# Patient Record
Sex: FEMALE | Race: WHITE | NOT HISPANIC OR LATINO | Employment: OTHER | URBAN - METROPOLITAN AREA
[De-identification: names, ages, dates, MRNs, and addresses within clinical notes are randomized per-mention and may not be internally consistent; named-entity substitution may affect disease eponyms.]

---

## 2018-06-25 ENCOUNTER — OFFICE VISIT CONVERTED (OUTPATIENT)
Dept: FAMILY MEDICINE CLINIC | Facility: CLINIC | Age: 63
End: 2018-06-25
Attending: NURSE PRACTITIONER

## 2018-06-25 ENCOUNTER — CONVERSION ENCOUNTER (OUTPATIENT)
Dept: FAMILY MEDICINE CLINIC | Facility: CLINIC | Age: 63
End: 2018-06-25

## 2018-08-16 ENCOUNTER — OFFICE VISIT CONVERTED (OUTPATIENT)
Dept: FAMILY MEDICINE CLINIC | Facility: CLINIC | Age: 63
End: 2018-08-16
Attending: NURSE PRACTITIONER

## 2019-03-03 ENCOUNTER — HOSPITAL ENCOUNTER (OUTPATIENT)
Dept: OTHER | Facility: HOSPITAL | Age: 64
Discharge: HOME | End: 2019-03-03

## 2019-03-04 ENCOUNTER — HOSPITAL ENCOUNTER (OUTPATIENT)
Dept: OTHER | Facility: HOSPITAL | Age: 64
Discharge: HOME | End: 2019-03-04

## 2019-03-04 LAB
BASOPHILS # BLD AUTO: 0.06 10*3/UL (ref 0–0.2)
BASOPHILS NFR BLD AUTO: 0.7 % (ref 0–3)
CONV ABS IMM GRAN: 0.19 10*3/UL (ref 0–0.2)
CONV IMMATURE GRAN: 2.1 % (ref 0–1.8)
DEPRECATED RDW RBC AUTO: 47.3 FL (ref 36.4–46.3)
EOSINOPHIL # BLD AUTO: 0.29 10*3/UL (ref 0–0.7)
EOSINOPHIL # BLD AUTO: 3.2 % (ref 0–7)
ERYTHROCYTE [DISTWIDTH] IN BLOOD BY AUTOMATED COUNT: 13 % (ref 11.7–14.4)
HBA1C MFR BLD: 10 G/DL (ref 12–16)
HCT VFR BLD AUTO: 31.4 % (ref 37–47)
LYMPHOCYTES # BLD AUTO: 2.2 10*3/UL (ref 1–5)
MCH RBC QN AUTO: 31.6 PG (ref 27–31)
MCHC RBC AUTO-ENTMCNC: 31.8 G/DL (ref 33–37)
MCV RBC AUTO: 99.4 FL (ref 81–99)
MONOCYTES # BLD AUTO: 0.93 10*3/UL (ref 0.2–1.2)
MONOCYTES NFR BLD AUTO: 10.3 % (ref 3–10)
NEUTROPHILS # BLD AUTO: 5.35 10*3/UL (ref 2–8)
NEUTROPHILS NFR BLD AUTO: 59.3 % (ref 30–85)
NRBC CBCN: 0 % (ref 0–0.7)
PLATELET # BLD AUTO: 437 10*3/UL (ref 130–400)
PMV BLD AUTO: 10.1 FL (ref 9.4–12.3)
RBC # BLD AUTO: 3.16 10*6/UL (ref 4.2–5.4)
VARIANT LYMPHS NFR BLD MANUAL: 24.4 % (ref 20–45)
WBC # BLD AUTO: 9.02 10*3/UL (ref 4.8–10.8)

## 2019-03-11 ENCOUNTER — OFFICE VISIT CONVERTED (OUTPATIENT)
Dept: ORTHOPEDIC SURGERY | Facility: CLINIC | Age: 64
End: 2019-03-11
Attending: ORTHOPAEDIC SURGERY

## 2019-03-18 ENCOUNTER — CONVERSION ENCOUNTER (OUTPATIENT)
Dept: FAMILY MEDICINE CLINIC | Facility: CLINIC | Age: 64
End: 2019-03-18

## 2019-03-18 ENCOUNTER — OFFICE VISIT CONVERTED (OUTPATIENT)
Dept: FAMILY MEDICINE CLINIC | Facility: CLINIC | Age: 64
End: 2019-03-18
Attending: NURSE PRACTITIONER

## 2019-04-01 ENCOUNTER — OFFICE VISIT CONVERTED (OUTPATIENT)
Dept: SURGERY | Facility: CLINIC | Age: 64
End: 2019-04-01
Attending: NURSE PRACTITIONER

## 2019-04-12 ENCOUNTER — OFFICE VISIT CONVERTED (OUTPATIENT)
Dept: ORTHOPEDIC SURGERY | Facility: CLINIC | Age: 64
End: 2019-04-12
Attending: ORTHOPAEDIC SURGERY

## 2019-04-12 ENCOUNTER — CONVERSION ENCOUNTER (OUTPATIENT)
Dept: ORTHOPEDIC SURGERY | Facility: CLINIC | Age: 64
End: 2019-04-12

## 2019-04-19 ENCOUNTER — CONVERSION ENCOUNTER (OUTPATIENT)
Dept: FAMILY MEDICINE CLINIC | Facility: CLINIC | Age: 64
End: 2019-04-19

## 2019-04-19 ENCOUNTER — OFFICE VISIT CONVERTED (OUTPATIENT)
Dept: FAMILY MEDICINE CLINIC | Facility: CLINIC | Age: 64
End: 2019-04-19
Attending: NURSE PRACTITIONER

## 2019-04-19 ENCOUNTER — HOSPITAL ENCOUNTER (OUTPATIENT)
Dept: LAB | Facility: HOSPITAL | Age: 64
Discharge: HOME | End: 2019-04-19
Attending: NURSE PRACTITIONER

## 2019-04-19 LAB
25(OH)D3 SERPL-MCNC: 25.1 NG/ML (ref 30–100)
BASOPHILS # BLD AUTO: 0.04 10*3/UL (ref 0–0.2)
BASOPHILS NFR BLD AUTO: 0.4 % (ref 0–3)
CONV ABS IMM GRAN: 0.06 10*3/UL (ref 0–0.2)
CONV IMMATURE GRAN: 0.6 % (ref 0–1.8)
DEPRECATED RDW RBC AUTO: 47.3 FL (ref 36.4–46.3)
EOSINOPHIL # BLD AUTO: 0.13 10*3/UL (ref 0–0.7)
EOSINOPHIL # BLD AUTO: 1.3 % (ref 0–7)
ERYTHROCYTE [DISTWIDTH] IN BLOOD BY AUTOMATED COUNT: 13.3 % (ref 11.7–14.4)
FERRITIN SERPL-MCNC: 80 NG/ML (ref 10–200)
FOLATE SERPL-MCNC: >20 NG/ML (ref 4.8–20)
HBA1C MFR BLD: 13.6 G/DL (ref 12–16)
HCT VFR BLD AUTO: 42 % (ref 37–47)
IRON SATN MFR SERPL: 17 % (ref 20–55)
IRON SERPL-MCNC: 50 UG/DL (ref 60–170)
LYMPHOCYTES # BLD AUTO: 2.44 10*3/UL (ref 1–5)
MCH RBC QN AUTO: 31 PG (ref 27–31)
MCHC RBC AUTO-ENTMCNC: 32.4 G/DL (ref 33–37)
MCV RBC AUTO: 95.7 FL (ref 81–99)
MONOCYTES # BLD AUTO: 0.78 10*3/UL (ref 0.2–1.2)
MONOCYTES NFR BLD AUTO: 7.6 % (ref 3–10)
NEUTROPHILS # BLD AUTO: 6.83 10*3/UL (ref 2–8)
NEUTROPHILS NFR BLD AUTO: 66.4 % (ref 30–85)
NRBC CBCN: 0 % (ref 0–0.7)
PLATELET # BLD AUTO: 314 10*3/UL (ref 130–400)
PMV BLD AUTO: 10.6 FL (ref 9.4–12.3)
RBC # BLD AUTO: 4.39 10*6/UL (ref 4.2–5.4)
TIBC SERPL-MCNC: 302 UG/DL (ref 245–450)
TRANSFERRIN SERPL-MCNC: 211 MG/DL (ref 250–380)
VARIANT LYMPHS NFR BLD MANUAL: 23.7 % (ref 20–45)
VIT B12 SERPL-MCNC: 485 PG/ML (ref 211–911)
WBC # BLD AUTO: 10.28 10*3/UL (ref 4.8–10.8)

## 2019-04-22 ENCOUNTER — HOSPITAL ENCOUNTER (OUTPATIENT)
Dept: GASTROENTEROLOGY | Facility: HOSPITAL | Age: 64
Setting detail: HOSPITAL OUTPATIENT SURGERY
Discharge: HOME | End: 2019-04-22
Attending: SURGERY

## 2019-05-06 ENCOUNTER — OFFICE VISIT CONVERTED (OUTPATIENT)
Dept: SURGERY | Facility: CLINIC | Age: 64
End: 2019-05-06
Attending: NURSE PRACTITIONER

## 2019-05-10 ENCOUNTER — OFFICE VISIT CONVERTED (OUTPATIENT)
Dept: ORTHOPEDIC SURGERY | Facility: CLINIC | Age: 64
End: 2019-05-10
Attending: ORTHOPAEDIC SURGERY

## 2019-05-16 ENCOUNTER — HOSPITAL ENCOUNTER (OUTPATIENT)
Dept: SLEEP MEDICINE | Facility: HOSPITAL | Age: 64
Discharge: HOME | End: 2019-05-16
Attending: PSYCHIATRY & NEUROLOGY

## 2019-05-19 ENCOUNTER — HOSPITAL ENCOUNTER (OUTPATIENT)
Dept: SLEEP MEDICINE | Facility: HOSPITAL | Age: 64
Discharge: HOME | End: 2019-05-19
Attending: PSYCHIATRY & NEUROLOGY

## 2019-06-10 ENCOUNTER — HOSPITAL ENCOUNTER (OUTPATIENT)
Dept: GENERAL RADIOLOGY | Facility: HOSPITAL | Age: 64
Discharge: HOME | End: 2019-06-10
Attending: NURSE PRACTITIONER

## 2019-06-13 ENCOUNTER — OFFICE VISIT CONVERTED (OUTPATIENT)
Dept: ORTHOPEDIC SURGERY | Facility: CLINIC | Age: 64
End: 2019-06-13
Attending: PHYSICIAN ASSISTANT

## 2019-07-25 ENCOUNTER — OFFICE VISIT CONVERTED (OUTPATIENT)
Dept: ORTHOPEDIC SURGERY | Facility: CLINIC | Age: 64
End: 2019-07-25
Attending: PHYSICIAN ASSISTANT

## 2019-08-14 ENCOUNTER — HOSPITAL ENCOUNTER (OUTPATIENT)
Dept: SLEEP MEDICINE | Facility: HOSPITAL | Age: 64
Discharge: HOME | End: 2019-08-14
Attending: PSYCHIATRY & NEUROLOGY

## 2019-08-23 ENCOUNTER — OFFICE VISIT CONVERTED (OUTPATIENT)
Dept: FAMILY MEDICINE CLINIC | Facility: CLINIC | Age: 64
End: 2019-08-23
Attending: NURSE PRACTITIONER

## 2020-02-17 ENCOUNTER — HOSPITAL ENCOUNTER (OUTPATIENT)
Dept: OTHER | Facility: HOSPITAL | Age: 65
Discharge: HOME | End: 2020-02-17

## 2020-02-17 LAB
T4 FREE SERPL-MCNC: 0.9 NG/DL (ref 0.9–1.8)
TSH SERPL-ACNC: 0.02 M[IU]/L (ref 0.27–4.2)

## 2020-03-16 ENCOUNTER — CONVERSION ENCOUNTER (OUTPATIENT)
Dept: FAMILY MEDICINE CLINIC | Facility: CLINIC | Age: 65
End: 2020-03-16

## 2020-03-16 ENCOUNTER — OFFICE VISIT CONVERTED (OUTPATIENT)
Dept: FAMILY MEDICINE CLINIC | Facility: CLINIC | Age: 65
End: 2020-03-16
Attending: NURSE PRACTITIONER

## 2020-03-16 ENCOUNTER — HOSPITAL ENCOUNTER (OUTPATIENT)
Dept: LAB | Facility: HOSPITAL | Age: 65
Discharge: HOME | End: 2020-03-16
Attending: NURSE PRACTITIONER

## 2020-03-16 LAB
ALBUMIN SERPL-MCNC: 4.4 G/DL (ref 3.5–5)
ALBUMIN/GLOB SERPL: 1.5 {RATIO} (ref 1.4–2.6)
ALP SERPL-CCNC: 127 U/L (ref 43–160)
ALT SERPL-CCNC: 13 U/L (ref 10–40)
ANION GAP SERPL CALC-SCNC: 19 MMOL/L (ref 8–19)
AST SERPL-CCNC: 15 U/L (ref 15–50)
BASOPHILS # BLD AUTO: 0.06 10*3/UL (ref 0–0.2)
BASOPHILS NFR BLD AUTO: 0.7 % (ref 0–3)
BILIRUB SERPL-MCNC: <0.15 MG/DL (ref 0.2–1.3)
BUN SERPL-MCNC: 17 MG/DL (ref 5–25)
BUN/CREAT SERPL: 17 {RATIO} (ref 6–20)
CALCIUM SERPL-MCNC: 9 MG/DL (ref 8.7–10.4)
CHLORIDE SERPL-SCNC: 104 MMOL/L (ref 99–111)
CONV ABS IMM GRAN: 0.08 10*3/UL (ref 0–0.2)
CONV CO2: 20 MMOL/L (ref 22–32)
CONV IMMATURE GRAN: 0.9 % (ref 0–1.8)
CONV TOTAL PROTEIN: 7.4 G/DL (ref 6.3–8.2)
CREAT UR-MCNC: 1 MG/DL (ref 0.5–0.9)
DEPRECATED RDW RBC AUTO: 53.1 FL (ref 36.4–46.3)
EOSINOPHIL # BLD AUTO: 0.21 10*3/UL (ref 0–0.7)
EOSINOPHIL # BLD AUTO: 2.3 % (ref 0–7)
ERYTHROCYTE [DISTWIDTH] IN BLOOD BY AUTOMATED COUNT: 14.5 % (ref 11.7–14.4)
GFR SERPLBLD BASED ON 1.73 SQ M-ARVRAT: 59 ML/MIN/{1.73_M2}
GLOBULIN UR ELPH-MCNC: 3 G/DL (ref 2–3.5)
GLUCOSE SERPL-MCNC: 105 MG/DL (ref 65–99)
HCT VFR BLD AUTO: 45.1 % (ref 37–47)
HGB BLD-MCNC: 14.5 G/DL (ref 12–16)
LYMPHOCYTES # BLD AUTO: 1.8 10*3/UL (ref 1–5)
LYMPHOCYTES NFR BLD AUTO: 19.7 % (ref 20–45)
MCH RBC QN AUTO: 31.9 PG (ref 27–31)
MCHC RBC AUTO-ENTMCNC: 32.2 G/DL (ref 33–37)
MCV RBC AUTO: 99.3 FL (ref 81–99)
MONOCYTES # BLD AUTO: 0.68 10*3/UL (ref 0.2–1.2)
MONOCYTES NFR BLD AUTO: 7.4 % (ref 3–10)
NEUTROPHILS # BLD AUTO: 6.3 10*3/UL (ref 2–8)
NEUTROPHILS NFR BLD AUTO: 69 % (ref 30–85)
NRBC CBCN: 0 % (ref 0–0.7)
OSMOLALITY SERPL CALC.SUM OF ELEC: 290 MOSM/KG (ref 273–304)
PLATELET # BLD AUTO: 283 10*3/UL (ref 130–400)
PMV BLD AUTO: 10.4 FL (ref 9.4–12.3)
POTASSIUM SERPL-SCNC: 4.4 MMOL/L (ref 3.5–5.3)
RBC # BLD AUTO: 4.54 10*6/UL (ref 4.2–5.4)
SODIUM SERPL-SCNC: 139 MMOL/L (ref 135–147)
WBC # BLD AUTO: 9.13 10*3/UL (ref 4.8–10.8)

## 2020-03-25 ENCOUNTER — HOSPITAL ENCOUNTER (OUTPATIENT)
Dept: LAB | Facility: HOSPITAL | Age: 65
Discharge: HOME | End: 2020-03-25
Attending: NURSE PRACTITIONER

## 2020-03-25 LAB
ALBUMIN SERPL-MCNC: 4.4 G/DL (ref 3.5–5)
ALBUMIN/GLOB SERPL: 1.6 {RATIO} (ref 1.4–2.6)
ALP SERPL-CCNC: 125 U/L (ref 43–160)
ALT SERPL-CCNC: 13 U/L (ref 10–40)
ANION GAP SERPL CALC-SCNC: 20 MMOL/L (ref 8–19)
AST SERPL-CCNC: 11 U/L (ref 15–50)
BILIRUB SERPL-MCNC: <0.15 MG/DL (ref 0.2–1.3)
BUN SERPL-MCNC: 17 MG/DL (ref 5–25)
BUN/CREAT SERPL: 18 {RATIO} (ref 6–20)
CALCIUM SERPL-MCNC: 9.9 MG/DL (ref 8.7–10.4)
CHLORIDE SERPL-SCNC: 105 MMOL/L (ref 99–111)
CONV CO2: 20 MMOL/L (ref 22–32)
CONV TOTAL PROTEIN: 7.2 G/DL (ref 6.3–8.2)
CREAT UR-MCNC: 0.93 MG/DL (ref 0.5–0.9)
GFR SERPLBLD BASED ON 1.73 SQ M-ARVRAT: >60 ML/MIN/{1.73_M2}
GLOBULIN UR ELPH-MCNC: 2.8 G/DL (ref 2–3.5)
GLUCOSE SERPL-MCNC: 116 MG/DL (ref 65–99)
OSMOLALITY SERPL CALC.SUM OF ELEC: 295 MOSM/KG (ref 273–304)
POTASSIUM SERPL-SCNC: 4 MMOL/L (ref 3.5–5.3)
SODIUM SERPL-SCNC: 141 MMOL/L (ref 135–147)

## 2020-04-17 ENCOUNTER — HOSPITAL ENCOUNTER (OUTPATIENT)
Dept: NUCLEAR MEDICINE | Facility: HOSPITAL | Age: 65
Discharge: HOME | End: 2020-04-17

## 2020-05-01 ENCOUNTER — HOSPITAL ENCOUNTER (OUTPATIENT)
Dept: LAB | Facility: HOSPITAL | Age: 65
Discharge: HOME | End: 2020-05-01
Attending: NURSE PRACTITIONER

## 2020-05-01 LAB
BASOPHILS # BLD AUTO: 0.04 10*3/UL (ref 0–0.2)
BASOPHILS NFR BLD AUTO: 0.5 % (ref 0–3)
CONV ABS IMM GRAN: 0.08 10*3/UL (ref 0–0.2)
CONV IMMATURE GRAN: 0.9 % (ref 0–1.8)
DEPRECATED RDW RBC AUTO: 51.8 FL (ref 36.4–46.3)
EOSINOPHIL # BLD AUTO: 0.24 10*3/UL (ref 0–0.7)
EOSINOPHIL # BLD AUTO: 2.8 % (ref 0–7)
ERYTHROCYTE [DISTWIDTH] IN BLOOD BY AUTOMATED COUNT: 14.1 % (ref 11.7–14.4)
FERRITIN SERPL-MCNC: 40 NG/ML (ref 10–200)
FOLATE SERPL-MCNC: 18.2 NG/ML (ref 4.8–20)
HCT VFR BLD AUTO: 47.7 % (ref 37–47)
HGB BLD-MCNC: 15.7 G/DL (ref 12–16)
IRON SATN MFR SERPL: 19 % (ref 20–55)
IRON SERPL-MCNC: 64 UG/DL (ref 60–170)
LYMPHOCYTES # BLD AUTO: 1.46 10*3/UL (ref 1–5)
LYMPHOCYTES NFR BLD AUTO: 17 % (ref 20–45)
MCH RBC QN AUTO: 32.6 PG (ref 27–31)
MCHC RBC AUTO-ENTMCNC: 32.9 G/DL (ref 33–37)
MCV RBC AUTO: 99.2 FL (ref 81–99)
MONOCYTES # BLD AUTO: 0.87 10*3/UL (ref 0.2–1.2)
MONOCYTES NFR BLD AUTO: 10.1 % (ref 3–10)
NEUTROPHILS # BLD AUTO: 5.89 10*3/UL (ref 2–8)
NEUTROPHILS NFR BLD AUTO: 68.7 % (ref 30–85)
NRBC CBCN: 0 % (ref 0–0.7)
PLATELET # BLD AUTO: 251 10*3/UL (ref 130–400)
PMV BLD AUTO: 11.1 FL (ref 9.4–12.3)
RBC # BLD AUTO: 4.81 10*6/UL (ref 4.2–5.4)
TIBC SERPL-MCNC: 342 UG/DL (ref 245–450)
TRANSFERRIN SERPL-MCNC: 239 MG/DL (ref 250–380)
VIT B12 SERPL-MCNC: 960 PG/ML (ref 211–911)
WBC # BLD AUTO: 8.58 10*3/UL (ref 4.8–10.8)

## 2020-06-19 ENCOUNTER — OFFICE VISIT CONVERTED (OUTPATIENT)
Dept: FAMILY MEDICINE CLINIC | Facility: CLINIC | Age: 65
End: 2020-06-19
Attending: NURSE PRACTITIONER

## 2020-06-19 ENCOUNTER — HOSPITAL ENCOUNTER (OUTPATIENT)
Dept: GENERAL RADIOLOGY | Facility: HOSPITAL | Age: 65
Discharge: HOME | End: 2020-06-19
Attending: NURSE PRACTITIONER

## 2020-06-19 ENCOUNTER — CONVERSION ENCOUNTER (OUTPATIENT)
Dept: FAMILY MEDICINE CLINIC | Facility: CLINIC | Age: 65
End: 2020-06-19

## 2020-06-23 ENCOUNTER — OFFICE VISIT CONVERTED (OUTPATIENT)
Dept: FAMILY MEDICINE CLINIC | Facility: CLINIC | Age: 65
End: 2020-06-23
Attending: NURSE PRACTITIONER

## 2020-06-25 ENCOUNTER — HOSPITAL ENCOUNTER (OUTPATIENT)
Dept: GENERAL RADIOLOGY | Facility: HOSPITAL | Age: 65
Discharge: HOME | End: 2020-06-25
Attending: NURSE PRACTITIONER

## 2020-07-09 ENCOUNTER — OFFICE VISIT CONVERTED (OUTPATIENT)
Dept: FAMILY MEDICINE CLINIC | Facility: CLINIC | Age: 65
End: 2020-07-09
Attending: NURSE PRACTITIONER

## 2020-09-01 ENCOUNTER — OFFICE VISIT CONVERTED (OUTPATIENT)
Dept: NEUROSURGERY | Facility: CLINIC | Age: 65
End: 2020-09-01
Attending: NEUROLOGICAL SURGERY

## 2020-10-23 ENCOUNTER — HOSPITAL ENCOUNTER (OUTPATIENT)
Dept: OTHER | Facility: HOSPITAL | Age: 65
Discharge: HOME | End: 2020-10-23
Attending: INTERNAL MEDICINE

## 2020-10-23 LAB
ANION GAP SERPL CALC-SCNC: 22 MMOL/L (ref 8–19)
BUN SERPL-MCNC: 11 MG/DL (ref 5–25)
BUN/CREAT SERPL: 9 {RATIO} (ref 6–20)
CALCIUM SERPL-MCNC: 10.6 MG/DL (ref 8.7–10.4)
CHLORIDE SERPL-SCNC: 103 MMOL/L (ref 99–111)
CONV CO2: 20 MMOL/L (ref 22–32)
CREAT UR-MCNC: 1.17 MG/DL (ref 0.5–0.9)
GFR SERPLBLD BASED ON 1.73 SQ M-ARVRAT: 48 ML/MIN/{1.73_M2}
GLUCOSE SERPL-MCNC: 150 MG/DL (ref 65–99)
OSMOLALITY SERPL CALC.SUM OF ELEC: 296 MOSM/KG (ref 273–304)
POTASSIUM SERPL-SCNC: 3.3 MMOL/L (ref 3.5–5.3)
SODIUM SERPL-SCNC: 142 MMOL/L (ref 135–147)

## 2020-10-26 ENCOUNTER — HOSPITAL ENCOUNTER (OUTPATIENT)
Dept: OTHER | Facility: HOSPITAL | Age: 65
Discharge: HOME | End: 2020-10-26
Attending: NURSE PRACTITIONER

## 2020-10-26 LAB
ANION GAP SERPL CALC-SCNC: 18 MMOL/L (ref 8–19)
BUN SERPL-MCNC: 8 MG/DL (ref 5–25)
BUN/CREAT SERPL: 9 {RATIO} (ref 6–20)
CALCIUM SERPL-MCNC: 10.1 MG/DL (ref 8.7–10.4)
CHLORIDE SERPL-SCNC: 102 MMOL/L (ref 99–111)
CONV CO2: 23 MMOL/L (ref 22–32)
CREAT UR-MCNC: 0.92 MG/DL (ref 0.5–0.9)
GFR SERPLBLD BASED ON 1.73 SQ M-ARVRAT: >60 ML/MIN/{1.73_M2}
GLUCOSE SERPL-MCNC: 158 MG/DL (ref 65–99)
OSMOLALITY SERPL CALC.SUM OF ELEC: 292 MOSM/KG (ref 273–304)
POTASSIUM SERPL-SCNC: 3 MMOL/L (ref 3.5–5.3)
SODIUM SERPL-SCNC: 140 MMOL/L (ref 135–147)

## 2020-11-02 ENCOUNTER — HOSPITAL ENCOUNTER (OUTPATIENT)
Dept: OTHER | Facility: HOSPITAL | Age: 65
Discharge: HOME | End: 2020-11-02
Attending: NURSE PRACTITIONER

## 2020-11-02 LAB
ALBUMIN SERPL-MCNC: 3.6 G/DL (ref 3.5–5)
ALBUMIN/GLOB SERPL: 1.4 {RATIO} (ref 1.4–2.6)
ALP SERPL-CCNC: 95 U/L (ref 43–160)
ALT SERPL-CCNC: 19 U/L (ref 10–40)
ANION GAP SERPL CALC-SCNC: 14 MMOL/L (ref 8–19)
AST SERPL-CCNC: 20 U/L (ref 15–50)
BILIRUB SERPL-MCNC: <0.15 MG/DL (ref 0.2–1.3)
BUN SERPL-MCNC: 11 MG/DL (ref 5–25)
BUN/CREAT SERPL: 12 {RATIO} (ref 6–20)
CALCIUM SERPL-MCNC: 9.7 MG/DL (ref 8.7–10.4)
CHLORIDE SERPL-SCNC: 103 MMOL/L (ref 99–111)
CONV CO2: 23 MMOL/L (ref 22–32)
CONV TOTAL PROTEIN: 6.1 G/DL (ref 6.3–8.2)
CREAT UR-MCNC: 0.95 MG/DL (ref 0.5–0.9)
GFR SERPLBLD BASED ON 1.73 SQ M-ARVRAT: >60 ML/MIN/{1.73_M2}
GLOBULIN UR ELPH-MCNC: 2.5 G/DL (ref 2–3.5)
GLUCOSE SERPL-MCNC: 149 MG/DL (ref 65–99)
OSMOLALITY SERPL CALC.SUM OF ELEC: 284 MOSM/KG (ref 273–304)
POTASSIUM SERPL-SCNC: 4.3 MMOL/L (ref 3.5–5.3)
SODIUM SERPL-SCNC: 136 MMOL/L (ref 135–147)

## 2021-03-10 ENCOUNTER — HOSPITAL ENCOUNTER (OUTPATIENT)
Dept: URGENT CARE | Facility: CLINIC | Age: 66
Discharge: HOME | End: 2021-03-10
Attending: FAMILY MEDICINE

## 2021-03-10 LAB — GLUCOSE BLD-MCNC: 125 MG/DL (ref 65–99)

## 2021-04-15 ENCOUNTER — OFFICE VISIT CONVERTED (OUTPATIENT)
Dept: OTOLARYNGOLOGY | Facility: CLINIC | Age: 66
End: 2021-04-15
Attending: NURSE PRACTITIONER

## 2021-05-10 NOTE — H&P
History and Physical      Patient Name: Deepti Ramirez   Patient ID: [id number]   Sex: Female   YOB: 1955    Primary Care Provider: Sweetie MANNING   Referring Provider: Sweetie MANNING    Visit Date: September 1, 2020    Provider: Rohith Serrato MD   Location: [facility]   Location Address: [address] [place] [address]  [id number]   Location Phone: [phone]          Chief Complaint  · Back and left leg pain      History Of Present Illness  The patient is a 64 year old /White female, who presents on referral from Sweetie MANNING, for a neurosurgical evaluation of low back pain and left leg/groin pain.   The pain developed acutely two months ago. It is 5/10 in severity has a dull quality and radiates into the left groin in a nonspecific distribution. The pain has been waxing and waning in severity. The pain tends to be maximal in the morning. The patient states the pain is aggravated by prolonged standing and walking long distances. It is alleviated by heat.   She also reports decreased sensation in the left lower lumbar region. She denies bowel or bladder dysfunction. The patient has no prior history of neck or back surgery.   RECENT INTERVENTIONS:  She has been previously treated with physical therapy, chiropractic management, and gabapentin. The physical therapy was ineffective in relieving the pain. The chiropractic treatments were transiently effective.   INFORMATION REVIEWED:  The following information was reviewed: radiology reports and images. MRI of the lumbar spine revealed a herniated disc. The herniated disc is at L2-3 on the left.       Past Medical History  Anemia; Anxiety; Arthritis; Asthma; Cancer; Chronic Obstructive Pulmonary Disease; Depression; Hyperlipidemia; Seasonal allergies; Status-post: left open tibia and fibula fracture ORIF, 02/27/2019; Thyroid disorder         Past Surgical History  Colonoscopy; Metal  implants; Teeth extraction; Vulvectomy         Medication List  aspirin 81 mg oral tablet,delayed release (DR/EC); cyanocobalamin (vitamin B-12) 1,000 mcg/mL injection solution; diclofenac sodium 50 mg oral tablet,delayed release (DR/EC); escitalopram oxalate 10 mg oral tablet; lamotrigine 200 mg Oral tablet; levothyroxine 100 mcg oral tablet; Lidoderm 5 % topical adhesive patch,medicated; Neurontin 300 mg oral capsule; prazosin 5 mg oral capsule; quetiapine 400 mg oral tablet; Vitamin D3 4,000 unit oral capsule         Allergy List  Nickel allergy; SULFA (SULFONAMIDES)       Allergies Reconciled  Family Medical History  Heart Disease; Diabetes, unspecified type; - No Family History of Colorectal Cancer; Family history of Arthritis         Reproductive History   0 Para 0 0 0 0       Social History  Alcohol (Never); Alcohol Use (Never); lives with spouse; ; .; No known infection risk; Recreational Drug Use (Never); Retired.; Tobacco (Current every day)         Immunizations  Name Date Admin   Influenza    Influenza    Influenza    Pneumococcal    Prevnar 13    Tdap          Review of Systems  · Constitutional  o Denies  o : chills, excessive sweating, fatigue, fever, sycope/passing out, weight gain, weight loss  · Eyes  o Denies  o : changes in vision, blurry vision, double vision  · HENT  o Admits  o : nasal congestion, sinus pain, seasonal allergies  o Denies  o : loss of hearing, ringing in the ears, ear aches, sore throat, nose bleeds  · Cardiovascular  o Denies  o : blood clots, swollen legs, anemia, easy burising or bleeding, transfusions  · Respiratory  o Denies  o : shortness of breath, dry cough, productive cough, pneumonia, COPD  · Gastrointestinal  o Denies  o : difficulty swallowing, reflux  · Genitourinary  o Denies  o : incontinence  · Neurologic  o Admits  o : headache  o Denies  o : seizure, stroke, tremor, loss of balance, falls, dizziness/vertigo, difficulty with sleep,  "numbness/tingling/paresthesia , difficulty with coordination, difficulty with dexterity, weakness  · Musculoskeletal  o Admits  o : sciatica, low back pain  o Denies  o : neck stiffness/pain, swollen lymph nodes, muscle aches, joint pain, weakness, spasms, pain radiating in arm, pain radiating in leg  · Endocrine  o Denies  o : diabetes, thyroid disorder  · Psychiatric  o Admits  o : anxiety, depression  · All Others Negative      Vitals  Date Time BP Position Site L\R Cuff Size HR RR TEMP (F) WT  HT  BMI kg/m2 BSA m2 O2 Sat HC       09/01/2020 02:42 PM        98 187lbs 1oz 5'  1.5\" 34.77 1.92           Physical Examination  · Constitutional  o Appearance  o : well-nourished, well developed, alert, in no acute distress  · Respiratory  o Respiratory Effort  o : breathing unlabored  · Cardiovascular  o Peripheral Vascular System  o :   § Extremities  § : no edema or cyanosis  · Musculoskeletal  o Spine  o :   § Inspection/Palpation  § : mild TTP in the lumbar region  o Right Lower Extremity  o :   § Inspection/Palpation  § : no joint or limb tenderness to palpation, no edema present, no ecchymosis  § Joint Stability  § : joint stability within normal limits  § Range of Motion  § : range of motion normal, no joint crepitations present, no pain on motion, Norberto's test negative  o Left Lower Extremity  o :   § Inspection/Palpation  § : no joint or limb tenderness to palpation, no edema present, no ecchymosis  § Joint Stability  § : joint stability within normal limits  § Range of Motion  § : range of motion normal, no joint crepitations present, no pain on motion, Norberto's test negative  · Skin and Subcutaneous Tissue  o Extremities  o :   § Right Lower Extremity  § : no lesions or areas of discoloration  § Left Lower Extremity  § : no lesions or areas of discoloration  o Back  o : no lesions or areas of discoloration  · Neurologic  o Mental Status Examination  o :   § Orientation  § : alert and oriented to time, " person, place and events  o Motor Examination  o :   § RLE Strength  § : strength normal  § RLE Motor Function  § : tone normal, no atrophy, no abnormal movements noted  § LLE Strength  § : strength normal  § LLE Motor Function  § : tone normal, no atrophy, no abnormal movements noted  o Reflexes  o :   § RLE  § : knee and ankle reflexes 2/4, SLR negative  § LLE  § : knee and ankle reflexes 2/4, SLR negative  o Sensation  o :   § Light Touch  § : sensation intact to light touch in extremities, decreased on left near SI  o Gait and Station  o :   § Gait Screening  § : normal gait, able to stand without difficulty  · Psychiatric  o Mood and Affect  o : mood normal, affect appropriate          Assessment  · Lumbar disc herniation, L2-3     722.10/M51.26  Left, superior    Problems Reconciled  Plan  · Medications  o Medications have been Reconciled  o Transition of Care or Provider Policy  · Instructions  o Encouraged to follow-up with Primary Care Provider for preventative care.  o The ROS and the PFSH were reviewed at today's visit.  o I think the problem is her left L2-3 disc herniation. We will try an LESB and if not helpful, consider discectomy.   · Disposition  o Return to clinic in one month.  · Associate Tasks  o Task ID [id number] Rx Request: Please refer for CPA for LESB.            Electronically Signed by: Rohith Serrato MD -Author on September 1, 2020 04:10:29 PM

## 2021-05-11 NOTE — H&P
History and Physical      Patient Name: Deepti Ramirez   Patient ID: [id number]   Sex: Female   YOB: 1955    Primary Care Provider: Sweetie MANNING   Referring Provider: Sweetie MANNING    Visit Date: April 15, 2021    Provider: NIGEL Carrington   Location: [facility]   Location Address: [address] [place] [address], [address] [place] [address]  [id number]   Location Phone: [phone]          Chief Complaint     1. Left maxillary sinusitis    2. right cerumen impaction       History Of Present Illness  Deepti Ramirez is a 65 year old /White female who presents to the office today as a consult from Sweetie MANNING.      She presents to the clinic today for evaluation of her sinuses.  She states that for many years she has had issues with recurrent sinus infections.  She states that she often feels congested with clear to yellow nasal drainage and what she describes as sinus headaches.  She states that she has pain and pressure along her frontal sinuses.  She states she will get diagnosed with sinus infection maybe a couple times per year and this has been going on for many years.  She was recently started on Flonase.  She has not been treated recently for sinus infection and does not do any sinus rinses.  She did recently have a stroke it was noted on exam what was believed to be a left lateral neck mass.  She states that she had not noticed a mass and it was not causing any pain.  A CT scanning of her neck was ordered that showed no abnormality in the left lateral neck but it was noted that she had chronic left maxillary sinus opacification.  The other sinuses were clear.       Past Medical History  Anemia; Anxiety; Arthritis; Asthma; Cancer; Chronic Obstructive Pulmonary Disease; Depression; Hyperlipidemia; Seasonal allergies; Sinus infection; Status-post: left open tibia and fibula fracture ORIF, 02/27/2019; Thyroid disorder         Past Surgical  History  Colonoscopy; Metal implants; Teeth extraction; Vulvectomy         Medication List  aripiprazole 10 mg oral tablet; aspirin 81 mg oral tablet,delayed release (DR/EC); cyanocobalamin (vitamin B-12) 1,000 mcg/mL injection solution; diclofenac sodium 50 mg oral tablet,delayed release (DR/EC); escitalopram oxalate 10 mg oral tablet; levothyroxine 100 mcg oral tablet; Lidoderm 5 % topical adhesive patch,medicated; Neurontin 300 mg oral capsule; prazosin 5 mg oral capsule; Vitamin D3 4,000 unit oral capsule         Allergy List  Nickel allergy; SULFA (SULFONAMIDES)         Family Medical History  - No Family History of Colorectal Cancer; Family history of Arthritis; Family history of heart disease; Family history of diabetes mellitus         Reproductive History   0 Para 0 0 0 0       Social History  Alcohol (Never); lives with spouse; ; No known infection risk; Recreational Drug Use (Never); Retired.; Tobacco (Current every day)         Immunizations  Name Date Admin   Influenza 2019   Influenza 10/27/2016   Influenza 10/03/2014   Pneumococcal 10/03/2014   Prevnar 13 2019   Tdap 09/15/2016         Review of Systems  · Constitutional  o Denies  o : fever, night sweats, weight loss  · Eyes  o Denies  o : discharge from eye, impaired vision  · HENT  o Admits  o : *See HPI  · Cardiovascular  o Denies  o : chest pain, irregular heart beats  · Respiratory  o Denies  o : shortness of breath, wheezing, coughing up blood  · Gastrointestinal  o Denies  o : heartburn, reflux, vomiting blood  · Genitourinary  o Denies  o : frequency  · Integument  o Denies  o : rash, skin dryness  · Neurologic  o Denies  o : seizures, loss of balance, loss of consciousness, dizziness  · Endocrine  o Denies  o : cold intolerance, heat intolerance  · Heme-Lymph  o Denies  o : easy bleeding, anemia      Vitals  Date Time BP Position Site L\R Cuff Size HR RR TEMP (F) WT  HT  BMI kg/m2 BSA m2 O2 Sat FR L/min FiO2 HC      "  04/15/2021 09:36 AM        96 175lbs 4oz 5'  1.5\" 32.58 1.86             Physical Examination  · Constitutional  o Appearance  o : well developed, well-nourished, alert and in no acute distress, voice clear and strong  · Head and Face  o Head  o :   § Inspection  § : no deformities or lesions  o Face  o :   § Inspection  § : No facial lesions; House-Brackmann I/VI bilaterally  § Palpation  § : No TMJ crepitus nor  muscle tenderness bilaterally  · Eyes  o Vision  o :   § Visual Fields  § : Extraocular movements are intact. No spontaneous or gaze-induced nystagmus.  o Conjunctivae  o : clear  o Sclerae  o : clear  o Pupils and Irises  o : pupils equal, round, and reactive to light.   · Ears, Nose, Mouth and Throat  o Ears  o :   § External Ears  § : appearance within normal limits, no lesions present  § Otoscopic Examination  § : Right cerumen impaction, cleared under the microscope using alligator forceps, tympanic membrane appearance within normal limits bilaterally without perforations, well-aerated middle ears  § Hearing  § : intact to conversational voice both ears  o Nose  o :   § External Nose  § : appearance normal  § Intranasal Exam  § : mucosa within normal limits, vestibules normal, no intranasal lesions present, septum midline, sinuses tender to percussion  o Oral Cavity  o :   § Oral Mucosa  § : oral mucosa normal without pallor or cyanosis  § Lips  § : lip appearance normal  § Teeth  § : normal dentition for age  § Gums  § : gums pink, non-swollen, no bleeding present  § Tongue  § : tongue appearance normal; normal mobility  § Palate  § : hard palate normal, soft palate appearance normal with symmetric mobility  o Throat  o :   § Oropharynx  § : no inflammation or lesions present, tonsils within normal limits  · Neck  o Inspection/Palpation  o : normal appearance, no masses or tenderness, trachea midline; thyroid size normal, nontender, no nodules or masses present on " palpation  · Respiratory  o Respiratory Effort  o : breathing unlabored  o Inspection of Chest  o : normal appearance, no retractions  · Lymphatic  o Neck  o : no lymphadenopathy present  o Supraclavicular Nodes  o : no lymphadenopathy present  o Preauricular Nodes  o : no lymphadenopathy present  · Skin and Subcutaneous Tissue  o General Inspection  o : Regarding face and neck - there are no rashes present, no lesions present, and no areas of discoloration  · Neurologic  o Cranial Nerves  o : cranial nerves II-XII are grossly intact bilaterally  o Gait and Station  o : normal gait, able to stand without diffculty  · Psychiatric  o Judgement and Insight  o : judgment and insight intact  o Mood and Affect  o : mood normal, affect appropriate          Assessment  · Impacted cerumen, right ear     380.4/H61.21  · Maxillary sinusitis, chronic     473.0/J32.0      Plan  · Orders  o Removal of impacted cerumen (29752) - 380.4/H61.21 - 04/15/2021  · Medications  o Augmentin 875-125 mg oral tablet   SIG: take 1 tablet by oral route every 12 hours for 10 days   DISP: (20) Tablet with 0 refills  Prescribed on 04/15/2021     o Medications have been Reconciled  o Transition of Care or Provider Policy  · Instructions  o She presents to the clinic today for evaluation of her sinuses. She states that for many years she has had issues with recurrent sinus infections. She states that she often feels congested with clear to yellow nasal drainage and what she describes as sinus headaches. She states that she has pain and pressure along her frontal sinuses. She states she will get diagnosed with sinus infection maybe a couple times per year and this has been going on for many years. She was recently started on Flonase. She has not been treated recently for sinus infection and does not do any sinus rinses. She did recently have a stroke it was noted on exam what was believed to be a left lateral neck mass. She states that she had not  noticed a mass and it was not causing any pain. A CT scanning of her neck was ordered that showed no abnormality in the left lateral neck but it was noted that she had chronic left maxillary sinus opacification. The other sinuses were clear. On examination today I was able to review the images and report from her recent CT scan. I do not see an I am unable to palpate any mass in the left neck. She does have some tenderness to palpation of her frontal and maxillary sinuses. Her nose is free of any polyps or purulence. She did have a left-sided cerumen impaction which I was able to clear under the microscope using alligator forceps. I will treat her with a course of antibiotics for an acute sinus infection since she feels like she is congested and having rhinitis at this time. I am also going to have her start doing daily sinus rinses each night and use her Flonase after the rinse. I will plan to see her back in 2 months for follow-up.  o Electronically Identified Patient Education Materials Provided Electronically            Electronically Signed by: NIGEL Carrington -Author on April 15, 2021 10:18:55 AM

## 2021-05-13 NOTE — PROGRESS NOTES
Progress Note      Patient Name: Deepti Ramirez   Patient ID: [id number]   Sex: Female   YOB: 1955    Primary Care Provider: Sweetie MANNING   Referring Provider: Sweetie MANNING    Visit Date: July 9, 2020    Provider: NIGEL Delgado   Location: [facility]   Location Address: [address] [place] [address], [address]  [place], [place]  [id number]   Location Phone: [phone]          Chief Complaint  · F/u lower back pain     Patient is here to f/u from the chiropractor and lower back pain.  Patient states the pain is worse especially after seeing the chiropractor.  Patient states the pain medication is not helping.       History Of Present Illness  Deepti Ramirez is a 64 year old /White female who presents for evaluation and treatment of:      the patient presents in the office today and  the patient continues to have back pain left lower back SI joint and she has been chiropractor 2 times and she has been to PT 2 times and she is not better states the pain is now constant and she cannot walk far or  one place for long she had MRI showed disc left extrusion L2-3 she now states the pain does go to her left groin       Past Medical History  Disease Name Date Onset Notes   Anemia --  --    Anxiety --  --    Arthritis --  --    Asthma --  --    Cancer --  --    Chronic Obstructive Pulmonary Disease --  --    Depression --  --    Hyperlipidemia --  --    Seasonal allergies --  --    Status-post: left open tibia and fibula fracture ORIF, 02/27/2019 03/11/2019 --    Thyroid disorder --  --          Past Surgical History  Procedure Name Date Notes   Colonoscopy 04/22/2019 --    Metal implants --  --    Teeth extraction --  2 teeth pulled    Vulvectomy 2004 --          Medication List  Name Date Started Instructions   aspirin 81 mg oral tablet,delayed release (/EC) 06/23/2020 take 1 tablet (81 mg) by oral route once daily for 90 days   hydrocodone-acetaminophen 5-325 mg  oral tablet 2020 take 1 tablet by oral route every 6 hours as needed for pain for 3 days   lamotrigine 200 mg Oral tablet  take 1 tablet (200 mg) by oral route 2 times per day   levothyroxine 100 mcg oral tablet  take 1 tablet (100 mcg) by oral route once daily   Lidoderm 5 % topical adhesive patch,medicated 2020 apply 1 patch by transdermal route once daily (May wear up to 12hours.) for 30 days   Neurontin 300 mg oral capsule 2020 take 2 capsule (600 mg) by oral route 3 times per day for 90 days   prazosin 5 mg oral capsule  take 1 capsule (5 mg) by oral route 2 times per day   quetiapine 400 mg oral tablet  1 po qhs   Vitamin D3 4,000 unit oral capsule  --          Allergy List  Allergen Name Date Reaction Notes   Nickel allergy --  --  --    SULFA (SULFONAMIDES) --  rash --          Family Medical History  Disease Name Relative/Age Notes   Heart Disease Father/   Father   Diabetes, unspecified type Sister/   Sister   - No Family History of Colorectal Cancer  --    Family history of Arthritis Mother/   Mother         Reproductive History  Menstrual   Age Menarche: 13   Pregnancy Summary   Total Pregnancies: 0 Full Term: 0 Premature: 0   Ab Induced: 0 Ab Spontaneous: 0 Ectopics: 0   Multiples: 0 Livin         Social History  Finding Status Start/Stop Quantity Notes   Alcohol Never --/-- --  --    Alcohol Use Never --/-- --  does not drink   lives with spouse --  --/-- --  --     --  --/-- --  --    . --  --/-- --  --    No known infection risk --  --/-- --  --    Recreational Drug Use Never --/-- --  no   Retired. --  --/-- --  --    Tobacco Current every day --/-- --  former smoker, smoked 11-20 years  1 pack per day         Immunizations  NameDate Admin Mfg Trade Name Lot Number Route Inj VIS Given VIS Publication   Jkbuqadlt00/05/2019 GWEN Fluzone Quadrivalent  NE NE 2020    Comments:    Pfnyraamd09/ GWEN Fluarix, quadrivalent, preservative free [id number]  RD  "10/27/2016 08/07/2015   Comments: Patient tolerated well.   Rpqpzefpb16/03/2014 SKB Fluarix, quadrivalent, preservative free [id number] IM RD 10/03/2014 07/02/2012   Comments: Pt tolerated well   Nptqfwhznbrc50/03/2014 NE Not Entered  NE NE 02/06/2015 10/06/2009   Comments:    Prevnar 1311/05/2019 NE Not Entered  NE NE 03/16/2020    Comments:    Tdap09/15/2016 SKB BOOSTRIX [id number] IM RD 09/15/2016 01/24/2012   Comments: patient tolerated well.         Review of Systems  · Constitutional  o Denies  o : fever, weight gain, weight loss, malaise/fatigue  · Cardiovascular  o Denies  o : palpitation, chest pain, claudication, pedal edema  · Respiratory  o Denies  o : shortness of breath, BRADLEY,   · Gastrointestinal  o Denies  o : nausea, vomiting, reflux, diarrhea, constipation, abdominal pain, blood in stools  · Genitourinary  o Denies  o : frequency, urgency, dysuria, incontinence, nocturia  · Neurologic  o Admits  o : refer to left groin  o Denies  o : unsteady gait, weakness, dizziness, H/A  · Musculoskeletal  o Admits  o : low back pain left si joint  o Denies  o : myalgias, joint swelling      Vitals  Date Time BP Position Site L\R Cuff Size HR RR TEMP (F) WT  HT  BMI kg/m2 BSA m2 O2 Sat        07/09/2020 02:35 /94 Sitting    104 - R   187lbs 0oz 5'  1.5\" 34.76 1.92           Physical Examination  · Constitutional  o Appearance  o : well-nourished, well developed, alert, in no acute distress  · Respiratory  o Respiratory Effort  o : breathing unlabored  o Auscultation of Lungs  o : normal breath sounds throughout  · Cardiovascular  o Heart  o :   § Auscultation of Heart  § : regular rate and rhythm, no murmurs, gallops or rubs  § Palpation of Heart  § : normal apical impulse, no cardiac thrill present  o Peripheral Vascular System  o :   § Carotid Arteries  § : normal pulses bilaterally, no bruits present  § Extremities  § : no cyanosis, clubbing or edema; less than 2 second refill noted  · Skin and Subcutaneous " Tissue  o General Inspection  o : no rashes or lesions present, no areas of discoloration  · Neurologic  o Mental Status Examination  o :   § Orientation  § : grossly oriented to person, place and time  o Cranial Nerves  o : cranial nerves intact and symmetric throughout  · Psychiatric  o Mood and Affect  o : mood normal, affect appropriate, denies any SI/HI          Assessment  · Lumbago     724.2/M54.5  · Lumbar disc herniation with radiculopathy       Intervertebral disc disorders with radiculopathy, lumbar region     722.10/M51.16      Plan  · Orders  o ACO-39: Current medications updated and reviewed () - - 07/09/2020  o NEUROSURGEON CONSULTATION (NEU) - 724.2/M54.5, 722.10/M51.16 - 07/09/2020   herniation L2-3   · Medications  o diclofenac sodium 50 mg oral tablet,delayed release (DR/EC)   SIG: take 1 tablet (50 mg) by oral route 2 times per day for 30 days   DISP: (60) tablets with 1 refills  Prescribed on 07/09/2020     o Medications have been Reconciled  o Transition of Care or Provider Policy  · Instructions  o Handouts were given to patient:   o Patient is taking medications as prescribed and doing well.   o Take all medications as prescribed/directed.  o Patient was educated/instructed on their diagnosis, treatment and medications prior to discharge from the clinic today.  o Patient instructed to seek medical attention urgently for new or worsening symptoms.  o Call the office with any concerns or questions.  o Risks, benefits, and alternatives were discussed with the patient. The patient is aware of risks associated with: pain medications  o Chronic conditions reviewed and taken into consideration for today's treatment plan.  · Disposition  o Call or Return if symptoms worsen or persist.  o follow up prn or as needed  o Care Transition            Electronically Signed by: NIGEL Delgado -Author on July 9, 2020 07:46:01 PM

## 2021-05-13 NOTE — PROGRESS NOTES
Progress Note      Patient Name: Deepti Ramirez   Patient ID: [id number]   Sex: Female   YOB: 1955    Primary Care Provider: Sweetie MANNING   Referring Provider: Sweetie MANNING    Visit Date: June 19, 2020    Provider: NIGEL Delgado   Location: [facility]   Location Address: [address] [place] [address], [address]  [place], [place]  [id number]   Location Phone: [phone]          Chief Complaint  · Lower back pain     Patient is c/o lower back pain without radiculopathy.  Denies any injury.  Patient states she noticed some groin pain yesterday but this morning its more of a burning sensation. Denies any h/o kidney stones, any urinary symptoms.         History Of Present Illness  Deepti Ramirez is a 64 year old /White female who presents for evaluation and treatment of:      the patient presents in the office today and she c/o back pain states she got up about 5 days ago and her back was hurting her real bad in her left SI joint and yesterday she began having aching in her left groin she has osteopenia and she denies fall or injury ..she has h/o spiral fracture to her left leg and her orthopedist took her off Fosamax due to poor bone healing and she sees him in 3 months she has been told to inquire if she can resume Fosamax .  she has h/o Mri in 2016 showed congenital spinal stenosis L3/4 and neuroforaminal narrowing L5S1...in addition she needs mammogram       Past Medical History  Disease Name Date Onset Notes   Anemia --  --    Anxiety --  --    Arthritis --  --    Asthma --  --    Cancer --  --    Chronic Obstructive Pulmonary Disease --  --    Depression --  --    Hyperlipidemia --  --    Seasonal allergies --  --    Status-post: left open tibia and fibula fracture ORIF, 02/27/2019 03/11/2019 --    Thyroid disorder --  --          Past Surgical History  Procedure Name Date Notes   Colonoscopy 04/22/2019 --    Metal implants --  --    Teeth extraction --  2 teeth  pulled    Vulvectomy  --          Medication List  Name Date Started Instructions   aspirin 325 mg oral tablet  take 1 tablet (325 mg) by oral route once daily   lamotrigine 200 mg Oral tablet  take 1 tablet (200 mg) by oral route 2 times per day   levothyroxine 100 mcg oral tablet  take 1 tablet (100 mcg) by oral route once daily   Neurontin 300 mg oral capsule 2020 take 2 capsule (600 mg) by oral route 3 times per day for 90 days   prazosin 5 mg oral capsule  take 1 capsule (5 mg) by oral route 2 times per day   quetiapine 400 mg oral tablet  1 po qhs   Vitamin D3 4,000 unit oral capsule  --          Allergy List  Allergen Name Date Reaction Notes   Nickel allergy --  --  --    SULFA (SULFONAMIDES) --  rash --          Family Medical History  Disease Name Relative/Age Notes   Heart Disease Father/   Father   Diabetes, unspecified type Sister/   Sister   - No Family History of Colorectal Cancer  --    Family history of Arthritis Mother/   Mother         Reproductive History  Menstrual   Age Menarche: 13   Pregnancy Summary   Total Pregnancies: 0 Full Term: 0 Premature: 0   Ab Induced: 0 Ab Spontaneous: 0 Ectopics: 0   Multiples: 0 Livin         Social History  Finding Status Start/Stop Quantity Notes   Alcohol Never --/-- --  --    Alcohol Use Never --/-- --  does not drink   lives with spouse --  --/-- --  --     --  --/-- --  --    . --  --/-- --  --    No known infection risk --  --/-- --  --    Recreational Drug Use Never --/-- --  no   Retired. --  --/-- --  --    Tobacco Current every day --/-- --  former smoker, smoked 11-20 years  1 pack per day         Immunizations  NameDate Admin Mfg Trade Name Lot Number Route Inj VIS Given VIS Publication   Wrlyqcisx42/05/2019 SKLIAN Fluzone Quadrivalent  NE NE 2020    Comments:    Lnxzgfsmf36/ GWEN Fluarix, quadrivalent, preservative free T44G9 IM RD 10/27/2016 2015   Comments: Patient tolerated well.   Ohuvmrnoe60/2014  "SKB Fluarix, quadrivalent, preservative free 2A2KX IM RD 10/03/2014 07/02/2012   Comments: Pt tolerated well   Dnpbmacxzeac79/03/2014 NE Not Entered  NE NE 02/06/2015 10/06/2009   Comments:    Prevnar 1311/05/2019 NE Not Entered  NE NE 03/16/2020    Comments:    Tdap09/15/2016 SKB BOOSTRIX 5775L IM RD 09/15/2016 01/24/2012   Comments: patient tolerated well.         Review of Systems  · Constitutional  o Denies  o : fever, weight gain, weight loss, malaise/fatigue  · Cardiovascular  o Denies  o : palpitation, chest pain, claudication, pedal edema  · Respiratory  o Denies  o : shortness of breath, BRADLEY,  · Gastrointestinal  o Denies  o : nausea, vomiting, reflux, diarrhea, constipation, abdominal pain,   · Genitourinary  o Denies  o : frequency, urgency, dysuria, incontinence, nocturia,   · Neurologic  o Denies  o : unsteady gait, weakness, dizziness, H/A  · Musculoskeletal  o Admits  o : back pain left si area, h/o congenital spinal stenosis and neuroforaminal stenosis, osteopenia  o Denies  o : myalgias, joint swelling      Vitals  Date Time BP Position Site L\R Cuff Size HR RR TEMP (F) WT  HT  BMI kg/m2 BSA m2 O2 Sat        06/19/2020 10:08 /86 Sitting    99 - R   187lbs 6oz 5'  1.5\" 34.83 1.92           Physical Examination  · Constitutional  o Appearance  o : well-nourished, well developed, alert, in no acute distress  · Respiratory  o Respiratory Effort  o : breathing unlabored  o Auscultation of Lungs  o : normal breath sounds throughout  · Cardiovascular  o Heart  o :   § Auscultation of Heart  § : regular rate and rhythm, no murmurs, gallops or rubs  § Palpation of Heart  § : normal apical impulse, no cardiac thrill present  o Peripheral Vascular System  o :   § Carotid Arteries  § : normal pulses bilaterally, no bruits present  § Extremities  § : no cyanosis, clubbing or edema; less than 2 second refill noted  · Skin and Subcutaneous Tissue  o General Inspection  o : no rashes or lesions present, no " areas of discoloration  · Neurologic  o Mental Status Examination  o :   § Orientation  § : grossly oriented to person, place and time  o Cranial Nerves  o : cranial nerves intact and symmetric throughout  · Psychiatric  o Mood and Affect  o : mood normal, affect appropriate, denies any SI/HI     left si joint pain with palpation               Assessment  · Lumbago     724.2/M54.5  · Nicotine dependence     305.1/F17.200  · Visit for screening mammogram     V76.12/Z12.31  · Lower back pain     724.2/M54.5  · Sacroiliac joint pain     724.6/M53.3  · Spinal stenosis of lumbar region, unspecified whether neurogenic claudication present     724.02/M48.061  · Neuroforaminal stenosis of lumbar spine     724.02/M99.83      Plan  · Orders  o Lumbar Spine Complete [facility] (27890) - 724.2/M54.5 - 06/19/2020  o ACO-17: Screened for tobacco use AND received tobacco cessation intervention (4004F) - 305.1/F17.200 - 06/19/2020  o Screening Mammography; Bilateral 3D (65841, , 81572) - V76.12/Z12.31 - 09/08/2020   Scheduled 9/8/20 @ 2:30pm DANIELLE  o 2 - IM/SQ - Injection Fee [facility] (04319) - - 06/22/2020  o ACO-39: Current medications updated and reviewed () - - 06/19/2020  o Sacroiliac Joint xray 3 or more views [facility] Preferred View (53029) - - 06/19/2020   left si joint pain  o 6.00 - Kenalog Injection 60mg (-5) - - 06/19/2020   Injection - Kenalog 60 mg; Dose: 60 mg; Site: Right Gluteus; Route: intramuscular; Date: 06/19/2020 10:48:31; Exp: 11/01/2021; Lot: [id number]; Mfg: N/A; TradeName: triamcinolone; Location: [facility]; Administered By: Agnes Dorsey MA; Comment: tolerated well  o 4.00 - Toradol Injection 60mg (-1) - - 06/19/2020   Injection - Toradol 60 mg; Dose: 60 mg; Site: Left Gluteus; Route: intramuscular; Date: 06/19/2020 10:48:58; Exp: 11/01/2021; Lot: [id number]; Mfg: N/A; TradeName: ketorolac; Location: [facility]; Administered By: Agnes Dorsey MA; Comment: tolerated well  o Low dose CT  scan (LDCT) for lung cancer screening [facility] () - - 06/19/2020  · Medications  o Medications have been Reconciled  o Transition of Care or Provider Policy  · Instructions  o *Form of nicotine being used:   o Patient was strongly encouraged to discontinue use of any nicotine containing product or minimize the use of the product.  o Handouts were given to patient:   o Patient is taking medications as prescribed and doing well.   o Take all medications as prescribed/directed.  o Patient was educated/instructed on their diagnosis, treatment and medications prior to discharge from the clinic today.  o Patient instructed to seek medical attention urgently for new or worsening symptoms.  o Call the office with any concerns or questions.  o Chronic conditions reviewed and taken into consideration for today's treatment plan.  · Disposition  o Call or Return if symptoms worsen or persist.  o follow up 3 months  o follow up prn or as needed  o Care Transition            Electronically Signed by: NIGEL Delgado -Author on June 22, 2020 10:17:22 PM

## 2021-05-13 NOTE — PROGRESS NOTES
Progress Note      Patient Name: Deepti Ramirez   Patient ID: [id number]   Sex: Female   YOB: 1955    Primary Care Provider: Sweetie MANNING   Referring Provider: Sweetie MANNING    Visit Date: June 23, 2020    Provider: NIGEL Delgado   Location: [facility]   Location Address: [address] [place] [address], [address]  [place], [place]  [id number]   Location Phone: [phone]          Chief Complaint  · Lower back pain     Patient is c/o lower back pain states she has gone to the ER now twice in the last 2 days.       History Of Present Illness  Deepti Ramirez is a 64 year old /White female who presents for evaluation and treatment of:      the patient presents in the office today with continued back pain and she is with her  today she has been to the ER 2 times in 2 days and she has h/o left si joint pain and she has been on Neurontin for years for other issues she continues to have pain and her xrays indicate vacuum phenomenon of the joint we have discussed the benefits of chiropractic medicine with vacuum phenomenon due to the decreased in rom /flexibility in the joint that occurs we are also going to do MRI...in addition vascular calcs are seen on lumbar spine xray I have advised patient to take baby asa       Past Medical History  Disease Name Date Onset Notes   Anemia --  --    Anxiety --  --    Arthritis --  --    Asthma --  --    Cancer --  --    Chronic Obstructive Pulmonary Disease --  --    Depression --  --    Hyperlipidemia --  --    Seasonal allergies --  --    Status-post: left open tibia and fibula fracture ORIF, 02/27/2019 03/11/2019 --    Thyroid disorder --  --          Past Surgical History  Procedure Name Date Notes   Colonoscopy 04/22/2019 --    Metal implants --  --    Teeth extraction --  2 teeth pulled    Vulvectomy 2004 --          Medication List  Name Date Started Instructions   aspirin 81 mg oral tablet,delayed release (/EC) 06/23/2020  take 1 tablet (81 mg) by oral route once daily for 90 days   hydrocodone-acetaminophen 5-325 mg oral tablet 2020 take 1 tablet by oral route every 6 hours as needed for pain for 3 days   lamotrigine 200 mg Oral tablet  take 1 tablet (200 mg) by oral route 2 times per day   levothyroxine 100 mcg oral tablet  take 1 tablet (100 mcg) by oral route once daily   Neurontin 300 mg oral capsule 2020 take 2 capsule (600 mg) by oral route 3 times per day for 90 days   prazosin 5 mg oral capsule  take 1 capsule (5 mg) by oral route 2 times per day   quetiapine 400 mg oral tablet  1 po qhs   Vitamin D3 4,000 unit oral capsule  --          Allergy List  Allergen Name Date Reaction Notes   Nickel allergy --  --  --    SULFA (SULFONAMIDES) --  rash --          Family Medical History  Disease Name Relative/Age Notes   Heart Disease Father/   Father   Diabetes, unspecified type Sister/   Sister   - No Family History of Colorectal Cancer  --    Family history of Arthritis Mother/   Mother         Reproductive History  Menstrual   Age Menarche: 13   Pregnancy Summary   Total Pregnancies: 0 Full Term: 0 Premature: 0   Ab Induced: 0 Ab Spontaneous: 0 Ectopics: 0   Multiples: 0 Livin         Social History  Finding Status Start/Stop Quantity Notes   Alcohol Never --/-- --  --    Alcohol Use Never --/-- --  does not drink   lives with spouse --  --/-- --  --     --  --/-- --  --    . --  --/-- --  --    No known infection risk --  --/-- --  --    Recreational Drug Use Never --/-- --  no   Retired. --  --/-- --  --    Tobacco Current every day --/-- --  former smoker, smoked 11-20 years  1 pack per day         Immunizations  NameDate Admin Mfg Trade Name Lot Number Route Inj VIS Given VIS Publication   Zosgnwesm04/05/2019 GWEN Fluzone Quadrivalent  [facility] 2020    Comments:    Yakczpolc11/ GWEN Fluarix, quadrivalent, preservative free T44G9 IM RD 10/27/2016 2015   Comments: Patient tolerated  "well.   Zbgteqvmy28/03/2014 SKB Fluarix, quadrivalent, preservative free 2A2KX IM RD 10/03/2014 07/02/2012   Comments: Pt tolerated well   Brbcbefgjcns24/03/2014 NE Not Entered  NE NE 02/06/2015 10/06/2009   Comments:    Prevnar 1311/05/2019 NE Not Entered  NE NE 03/16/2020    Comments:    Tdap09/15/2016 SKB BOOSTRIX 5775L IM RD 09/15/2016 01/24/2012   Comments: patient tolerated well.         Review of Systems  · Constitutional  o Denies  o : fever, weight gain, weight loss, malaise/fatigue  · Cardiovascular  o Admits  o : vascular calcs  o Denies  o : palpitation, chest pain, claudication, pedal edema  · Respiratory  o Denies  o : shortness of breath, BRADLEY,   · Gastrointestinal  o Denies  o : nausea, vomiting, reflux, diarrhea, constipation, abdominal pain, blood in stools  · Genitourinary  o Denies  o : frequency, urgency, dysuria, incontinence, nocturia,  · Neurologic  o Denies  o : unsteady gait, weakness, dizziness, H/A  · Musculoskeletal  o Admits  o : low back pain left si joint  o Denies  o : joint swelling      Vitals  Date Time BP Position Site L\R Cuff Size HR RR TEMP (F) WT  HT  BMI kg/m2 BSA m2 O2 Sat        06/23/2020 01:31 /86 Sitting    99 - R   187lbs 0oz 5'  1.5\" 34.76 1.92           Physical Examination  · Constitutional  o Appearance  o : well-nourished, well developed, alert, in no acute distress  · Respiratory  o Respiratory Effort  o : breathing unlabored  o Auscultation of Lungs  o : normal breath sounds throughout  · Cardiovascular  o Heart  o :   § Auscultation of Heart  § : regular rate and rhythm, no murmurs, gallops or rubs  § Palpation of Heart  § : normal apical impulse, no cardiac thrill present  o Peripheral Vascular System  o :   § Carotid Arteries  § : normal pulses bilaterally, no bruits present  § Extremities  § : no cyanosis, clubbing or edema; less than 2 second refill noted  · Skin and Subcutaneous Tissue  o General Inspection  o : no rashes or lesions present, no " areas of discoloration  · Neurologic  o Mental Status Examination  o :   § Orientation  § : grossly oriented to person, place and time  o Cranial Nerves  o : cranial nerves intact and symmetric throughout  · Psychiatric  o Mood and Affect  o : mood normal, affect appropriate, denies any SI/HI     pain in left si joint with palpation           Assessment  · Lumbago     724.2/M54.5  · Osteoarthritis     715.90/M19.90  · SI (sacroiliac) joint dysfunction     724.6/M53.3  · Vascular calcification     459.89/I99.8      Plan  · Orders  o ACO-39: Current medications updated and reviewed () - - 06/23/2020  o Chiropractor Consultation (CHIRO) - - 06/23/2020  o MRI lumbar spine w/o contrast (83838) - - 06/23/2020  · Medications  o Lidoderm 5 % topical adhesive patch,medicated   SIG: apply 1 patch by transdermal route once daily (May wear up to 12hours.) for 30 days   DISP: (30) patches with 5 refills  Prescribed on 06/23/2020     o aspirin 81 mg oral tablet,delayed release (DR/EC)   SIG: take 1 tablet (81 mg) by oral route once daily for 90 days   DISP: (90) tablets with 0 refills  Adjusted on 06/23/2020     o Medications have been Reconciled  o Transition of Care or Provider Policy  · Instructions  o Handouts were given to patient:   o Patient is taking medications as prescribed and doing well.   o Take all medications as prescribed/directed.  o Patient was educated/instructed on their diagnosis, treatment and medications prior to discharge from the clinic today.  o Patient instructed to seek medical attention urgently for new or worsening symptoms.  o Call the office with any concerns or questions.  o Chronic conditions reviewed and taken into consideration for today's treatment plan.  · Disposition  o Call or Return if symptoms worsen or persist.  o follow up prn or as needed  o Care Transition            Electronically Signed by: NIGEL Delgado -Author on June 27, 2020 11:51:46 PM

## 2021-05-14 VITALS — HEIGHT: 61 IN | WEIGHT: 175.25 LBS | TEMPERATURE: 96 F | BODY MASS INDEX: 33.09 KG/M2

## 2021-05-14 VITALS — WEIGHT: 187.06 LBS | BODY MASS INDEX: 35.32 KG/M2 | HEIGHT: 61 IN | TEMPERATURE: 98 F

## 2021-05-15 VITALS — BODY MASS INDEX: 35.87 KG/M2 | OXYGEN SATURATION: 98 % | HEIGHT: 61 IN | WEIGHT: 190 LBS | HEART RATE: 118 BPM

## 2021-05-15 VITALS — BODY MASS INDEX: 36.32 KG/M2 | RESPIRATION RATE: 14 BRPM | WEIGHT: 192.37 LBS | HEIGHT: 61 IN

## 2021-05-15 VITALS
WEIGHT: 187 LBS | SYSTOLIC BLOOD PRESSURE: 152 MMHG | DIASTOLIC BLOOD PRESSURE: 86 MMHG | HEIGHT: 61 IN | HEART RATE: 99 BPM | BODY MASS INDEX: 35.3 KG/M2

## 2021-05-15 VITALS
SYSTOLIC BLOOD PRESSURE: 134 MMHG | BODY MASS INDEX: 35.87 KG/M2 | WEIGHT: 190 LBS | HEART RATE: 96 BPM | DIASTOLIC BLOOD PRESSURE: 76 MMHG | HEIGHT: 61 IN

## 2021-05-15 VITALS
BODY MASS INDEX: 37.95 KG/M2 | HEIGHT: 61 IN | HEART RATE: 76 BPM | SYSTOLIC BLOOD PRESSURE: 124 MMHG | DIASTOLIC BLOOD PRESSURE: 80 MMHG | OXYGEN SATURATION: 97 % | WEIGHT: 201 LBS

## 2021-05-15 VITALS
DIASTOLIC BLOOD PRESSURE: 94 MMHG | HEIGHT: 61 IN | HEART RATE: 104 BPM | SYSTOLIC BLOOD PRESSURE: 163 MMHG | BODY MASS INDEX: 35.3 KG/M2 | WEIGHT: 187 LBS

## 2021-05-15 VITALS
OXYGEN SATURATION: 97 % | HEIGHT: 61 IN | HEART RATE: 104 BPM | DIASTOLIC BLOOD PRESSURE: 72 MMHG | SYSTOLIC BLOOD PRESSURE: 109 MMHG | TEMPERATURE: 97.2 F

## 2021-05-15 VITALS — HEIGHT: 61 IN | HEART RATE: 115 BPM | OXYGEN SATURATION: 97 %

## 2021-05-15 VITALS
BODY MASS INDEX: 35.38 KG/M2 | HEART RATE: 99 BPM | SYSTOLIC BLOOD PRESSURE: 152 MMHG | WEIGHT: 187.37 LBS | HEIGHT: 61 IN | DIASTOLIC BLOOD PRESSURE: 86 MMHG

## 2021-05-15 VITALS — OXYGEN SATURATION: 98 % | WEIGHT: 190 LBS | HEART RATE: 98 BPM | HEIGHT: 61 IN | BODY MASS INDEX: 35.87 KG/M2

## 2021-05-15 VITALS — HEART RATE: 113 BPM | OXYGEN SATURATION: 96 % | HEIGHT: 61 IN

## 2021-05-15 VITALS — HEIGHT: 61 IN | WEIGHT: 190 LBS | BODY MASS INDEX: 35.87 KG/M2 | HEART RATE: 110 BPM

## 2021-05-16 VITALS — HEART RATE: 96 BPM | HEIGHT: 61 IN | OXYGEN SATURATION: 95 %

## 2021-05-16 VITALS
HEART RATE: 58 BPM | HEIGHT: 61 IN | SYSTOLIC BLOOD PRESSURE: 140 MMHG | WEIGHT: 202.12 LBS | DIASTOLIC BLOOD PRESSURE: 88 MMHG | BODY MASS INDEX: 38.16 KG/M2

## 2021-05-16 VITALS
HEIGHT: 61 IN | WEIGHT: 197 LBS | DIASTOLIC BLOOD PRESSURE: 66 MMHG | HEART RATE: 87 BPM | BODY MASS INDEX: 37.19 KG/M2 | SYSTOLIC BLOOD PRESSURE: 99 MMHG

## 2021-05-16 VITALS
SYSTOLIC BLOOD PRESSURE: 139 MMHG | HEIGHT: 61 IN | DIASTOLIC BLOOD PRESSURE: 88 MMHG | HEART RATE: 68 BPM | BODY MASS INDEX: 37.62 KG/M2 | WEIGHT: 199.25 LBS

## 2021-06-10 ENCOUNTER — APPOINTMENT (OUTPATIENT)
Dept: GENERAL RADIOLOGY | Facility: HOSPITAL | Age: 66
End: 2021-06-10

## 2021-06-10 ENCOUNTER — APPOINTMENT (OUTPATIENT)
Dept: CT IMAGING | Facility: HOSPITAL | Age: 66
End: 2021-06-10

## 2021-06-10 ENCOUNTER — APPOINTMENT (OUTPATIENT)
Dept: MRI IMAGING | Facility: HOSPITAL | Age: 66
End: 2021-06-10

## 2021-06-10 ENCOUNTER — HOSPITAL ENCOUNTER (INPATIENT)
Facility: HOSPITAL | Age: 66
LOS: 2 days | Discharge: HOME | End: 2021-06-13
Attending: EMERGENCY MEDICINE | Admitting: INTERNAL MEDICINE

## 2021-06-10 ENCOUNTER — APPOINTMENT (OUTPATIENT)
Dept: CARDIOLOGY | Facility: HOSPITAL | Age: 66
End: 2021-06-10

## 2021-06-10 DIAGNOSIS — R13.10 DYSPHAGIA, UNSPECIFIED TYPE: ICD-10-CM

## 2021-06-10 DIAGNOSIS — Z78.9 DECREASED ACTIVITIES OF DAILY LIVING (ADL): ICD-10-CM

## 2021-06-10 DIAGNOSIS — I63.9 CEREBROVASCULAR ACCIDENT (CVA), UNSPECIFIED MECHANISM (HCC): ICD-10-CM

## 2021-06-10 DIAGNOSIS — N30.00 ACUTE CYSTITIS WITHOUT HEMATURIA: ICD-10-CM

## 2021-06-10 DIAGNOSIS — R40.0 SOMNOLENCE: Primary | ICD-10-CM

## 2021-06-10 DIAGNOSIS — R26.2 DIFFICULTY WALKING: ICD-10-CM

## 2021-06-10 PROBLEM — F31.9 BIPOLAR DISORDER: Status: ACTIVE | Noted: 2021-06-10

## 2021-06-10 PROBLEM — F17.200 CURRENT SMOKER: Status: ACTIVE | Noted: 2021-06-10

## 2021-06-10 PROBLEM — N39.0 COMPLICATED UTI (URINARY TRACT INFECTION): Status: ACTIVE | Noted: 2021-06-10

## 2021-06-10 PROBLEM — Z86.711 HISTORY OF PULMONARY EMBOLUS (PE): Status: ACTIVE | Noted: 2019-10-01

## 2021-06-10 PROBLEM — M81.0 OSTEOPOROSIS: Status: ACTIVE | Noted: 2021-06-10

## 2021-06-10 PROBLEM — J44.9 CHRONIC OBSTRUCTIVE PULMONARY DISEASE: Status: ACTIVE | Noted: 2021-06-10

## 2021-06-10 PROBLEM — F43.10 POSTTRAUMATIC STRESS DISORDER: Status: ACTIVE | Noted: 2021-06-10

## 2021-06-10 LAB
ALBUMIN SERPL-MCNC: 4.3 G/DL (ref 3.5–5.2)
ALBUMIN/GLOB SERPL: 1.6 G/DL
ALP SERPL-CCNC: 119 U/L (ref 39–117)
ALT SERPL W P-5'-P-CCNC: 22 U/L (ref 1–33)
AMPHET+METHAMPHET UR QL: NEGATIVE
ANION GAP SERPL CALCULATED.3IONS-SCNC: 11.4 MMOL/L (ref 5–15)
APTT PPP: 22 SECONDS (ref 22.2–34.2)
AST SERPL-CCNC: 13 U/L (ref 1–32)
BACTERIA UR QL AUTO: ABNORMAL /HPF
BARBITURATES UR QL SCN: NEGATIVE
BASOPHILS # BLD AUTO: 0.04 10*3/MM3 (ref 0–0.2)
BASOPHILS NFR BLD AUTO: 0.4 % (ref 0–1.5)
BENZODIAZ UR QL SCN: NEGATIVE
BILIRUB SERPL-MCNC: <0.2 MG/DL (ref 0–1.2)
BILIRUB UR QL STRIP: NEGATIVE
BUN SERPL-MCNC: 21 MG/DL (ref 8–23)
BUN/CREAT SERPL: 21.2 (ref 7–25)
CALCIUM SPEC-SCNC: 9.4 MG/DL (ref 8.6–10.5)
CANNABINOIDS SERPL QL: NEGATIVE
CHLORIDE SERPL-SCNC: 103 MMOL/L (ref 98–107)
CLARITY UR: ABNORMAL
CO2 SERPL-SCNC: 22.6 MMOL/L (ref 22–29)
COCAINE UR QL: NEGATIVE
COLOR UR: YELLOW
CREAT BLDA-MCNC: 0.99 MG/DL
CREAT BLDA-MCNC: 1 MG/DL
CREAT SERPL-MCNC: 0.99 MG/DL (ref 0.57–1)
CRP SERPL-MCNC: 2.48 MG/DL (ref 0–0.5)
D-LACTATE SERPL-SCNC: 1.2 MMOL/L (ref 0.5–2)
DEPRECATED RDW RBC AUTO: 45.7 FL (ref 37–54)
EOSINOPHIL # BLD AUTO: 0.04 10*3/MM3 (ref 0–0.4)
EOSINOPHIL NFR BLD AUTO: 0.4 % (ref 0.3–6.2)
ERYTHROCYTE [DISTWIDTH] IN BLOOD BY AUTOMATED COUNT: 13.1 % (ref 12.3–15.4)
ERYTHROCYTE [SEDIMENTATION RATE] IN BLOOD: 8 MM/HR (ref 0–30)
ETHANOL BLD-MCNC: <10 MG/DL (ref 0–10)
ETHANOL UR QL: <0.01 %
GFR SERPL CREATININE-BSD FRML MDRD: 56 ML/MIN/1.73
GLOBULIN UR ELPH-MCNC: 2.7 GM/DL
GLUCOSE BLDC GLUCOMTR-MCNC: 175 MG/DL (ref 70–130)
GLUCOSE SERPL-MCNC: 163 MG/DL (ref 65–99)
GLUCOSE UR STRIP-MCNC: NEGATIVE MG/DL
HCT VFR BLD AUTO: 44.6 % (ref 34–46.6)
HGB BLD-MCNC: 15.7 G/DL (ref 12–15.9)
HGB UR QL STRIP.AUTO: NEGATIVE
HOLD SPECIMEN: NORMAL
HYALINE CASTS UR QL AUTO: ABNORMAL /LPF
IMM GRANULOCYTES # BLD AUTO: 0.13 10*3/MM3 (ref 0–0.05)
IMM GRANULOCYTES NFR BLD AUTO: 1.4 % (ref 0–0.5)
INR PPP: 0.86 (ref 2–3)
KETONES UR QL STRIP: NEGATIVE
LEUKOCYTE ESTERASE UR QL STRIP.AUTO: ABNORMAL
LYMPHOCYTES # BLD AUTO: 1.01 10*3/MM3 (ref 0.7–3.1)
LYMPHOCYTES NFR BLD AUTO: 10.8 % (ref 19.6–45.3)
MCH RBC QN AUTO: 33.5 PG (ref 26.6–33)
MCHC RBC AUTO-ENTMCNC: 35.2 G/DL (ref 31.5–35.7)
MCV RBC AUTO: 95.3 FL (ref 79–97)
METHADONE UR QL SCN: NEGATIVE
MONOCYTES # BLD AUTO: 0.5 10*3/MM3 (ref 0.1–0.9)
MONOCYTES NFR BLD AUTO: 5.3 % (ref 5–12)
NEUTROPHILS NFR BLD AUTO: 7.65 10*3/MM3 (ref 1.7–7)
NEUTROPHILS NFR BLD AUTO: 81.7 % (ref 42.7–76)
NITRITE UR QL STRIP: POSITIVE
NRBC BLD AUTO-RTO: 0 /100 WBC (ref 0–0.2)
OPIATES UR QL: NEGATIVE
OXYCODONE UR QL SCN: NEGATIVE
PH UR STRIP.AUTO: 6 [PH] (ref 5–8)
PLATELET # BLD AUTO: 200 10*3/MM3 (ref 140–450)
PMV BLD AUTO: 10.6 FL (ref 6–12)
POTASSIUM SERPL-SCNC: 4.1 MMOL/L (ref 3.5–5.2)
PROT SERPL-MCNC: 7 G/DL (ref 6–8.5)
PROT UR QL STRIP: ABNORMAL
PROTHROMBIN TIME: 9.7 SECONDS (ref 9.4–12)
RBC # BLD AUTO: 4.68 10*6/MM3 (ref 3.77–5.28)
RBC # UR: ABNORMAL /HPF
REF LAB TEST METHOD: ABNORMAL
SODIUM SERPL-SCNC: 137 MMOL/L (ref 136–145)
SP GR UR STRIP: 1.02 (ref 1–1.03)
SQUAMOUS #/AREA URNS HPF: ABNORMAL /HPF
TROPONIN T SERPL-MCNC: <0.01 NG/ML (ref 0–0.03)
TSH SERPL DL<=0.05 MIU/L-ACNC: 0.04 UIU/ML (ref 0.27–4.2)
UROBILINOGEN UR QL STRIP: ABNORMAL
WBC # BLD AUTO: 9.37 10*3/MM3 (ref 3.4–10.8)
WBC UR QL AUTO: ABNORMAL /HPF
WHOLE BLOOD HOLD SPECIMEN: NORMAL

## 2021-06-10 PROCEDURE — 82375 ASSAY CARBOXYHB QUANT: CPT | Performed by: EMERGENCY MEDICINE

## 2021-06-10 PROCEDURE — G0378 HOSPITAL OBSERVATION PER HR: HCPCS

## 2021-06-10 PROCEDURE — 25010000002 ONDANSETRON PER 1 MG: Performed by: EMERGENCY MEDICINE

## 2021-06-10 PROCEDURE — 80307 DRUG TEST PRSMV CHEM ANLYZR: CPT | Performed by: EMERGENCY MEDICINE

## 2021-06-10 PROCEDURE — 82805 BLOOD GASES W/O2 SATURATION: CPT | Performed by: EMERGENCY MEDICINE

## 2021-06-10 PROCEDURE — 70496 CT ANGIOGRAPHY HEAD: CPT

## 2021-06-10 PROCEDURE — 80053 COMPREHEN METABOLIC PANEL: CPT | Performed by: EMERGENCY MEDICINE

## 2021-06-10 PROCEDURE — 70450 CT HEAD/BRAIN W/O DYE: CPT

## 2021-06-10 PROCEDURE — 93005 ELECTROCARDIOGRAM TRACING: CPT | Performed by: EMERGENCY MEDICINE

## 2021-06-10 PROCEDURE — 82565 ASSAY OF CREATININE: CPT

## 2021-06-10 PROCEDURE — 83036 HEMOGLOBIN GLYCOSYLATED A1C: CPT | Performed by: FAMILY MEDICINE

## 2021-06-10 PROCEDURE — 86255 FLUORESCENT ANTIBODY SCREEN: CPT | Performed by: PSYCHIATRY & NEUROLOGY

## 2021-06-10 PROCEDURE — 96375 TX/PRO/DX INJ NEW DRUG ADDON: CPT

## 2021-06-10 PROCEDURE — 83050 HGB METHEMOGLOBIN QUAN: CPT | Performed by: EMERGENCY MEDICINE

## 2021-06-10 PROCEDURE — 81001 URINALYSIS AUTO W/SCOPE: CPT | Performed by: EMERGENCY MEDICINE

## 2021-06-10 PROCEDURE — 93880 EXTRACRANIAL BILAT STUDY: CPT

## 2021-06-10 PROCEDURE — P9612 CATHETERIZE FOR URINE SPEC: HCPCS

## 2021-06-10 PROCEDURE — 96376 TX/PRO/DX INJ SAME DRUG ADON: CPT

## 2021-06-10 PROCEDURE — 84484 ASSAY OF TROPONIN QUANT: CPT | Performed by: EMERGENCY MEDICINE

## 2021-06-10 PROCEDURE — 70551 MRI BRAIN STEM W/O DYE: CPT

## 2021-06-10 PROCEDURE — 85652 RBC SED RATE AUTOMATED: CPT | Performed by: FAMILY MEDICINE

## 2021-06-10 PROCEDURE — 93880 EXTRACRANIAL BILAT STUDY: CPT | Performed by: SURGERY

## 2021-06-10 PROCEDURE — 36415 COLL VENOUS BLD VENIPUNCTURE: CPT | Performed by: EMERGENCY MEDICINE

## 2021-06-10 PROCEDURE — 25010000002 CEFTRIAXONE PER 250 MG: Performed by: EMERGENCY MEDICINE

## 2021-06-10 PROCEDURE — 82077 ASSAY SPEC XCP UR&BREATH IA: CPT | Performed by: EMERGENCY MEDICINE

## 2021-06-10 PROCEDURE — 80177 DRUG SCRN QUAN LEVETIRACETAM: CPT | Performed by: FAMILY MEDICINE

## 2021-06-10 PROCEDURE — 99285 EMERGENCY DEPT VISIT HI MDM: CPT

## 2021-06-10 PROCEDURE — 87040 BLOOD CULTURE FOR BACTERIA: CPT | Performed by: EMERGENCY MEDICINE

## 2021-06-10 PROCEDURE — 36600 WITHDRAWAL OF ARTERIAL BLOOD: CPT | Performed by: EMERGENCY MEDICINE

## 2021-06-10 PROCEDURE — 96361 HYDRATE IV INFUSION ADD-ON: CPT

## 2021-06-10 PROCEDURE — 0 IOPAMIDOL PER 1 ML: Performed by: EMERGENCY MEDICINE

## 2021-06-10 PROCEDURE — 71045 X-RAY EXAM CHEST 1 VIEW: CPT

## 2021-06-10 PROCEDURE — 99204 OFFICE O/P NEW MOD 45 MIN: CPT | Performed by: PSYCHIATRY & NEUROLOGY

## 2021-06-10 PROCEDURE — 86140 C-REACTIVE PROTEIN: CPT | Performed by: FAMILY MEDICINE

## 2021-06-10 PROCEDURE — 82962 GLUCOSE BLOOD TEST: CPT

## 2021-06-10 PROCEDURE — 83605 ASSAY OF LACTIC ACID: CPT | Performed by: EMERGENCY MEDICINE

## 2021-06-10 PROCEDURE — 84443 ASSAY THYROID STIM HORMONE: CPT | Performed by: FAMILY MEDICINE

## 2021-06-10 PROCEDURE — 85610 PROTHROMBIN TIME: CPT | Performed by: EMERGENCY MEDICINE

## 2021-06-10 PROCEDURE — 85730 THROMBOPLASTIN TIME PARTIAL: CPT | Performed by: EMERGENCY MEDICINE

## 2021-06-10 PROCEDURE — 85025 COMPLETE CBC W/AUTO DIFF WBC: CPT | Performed by: EMERGENCY MEDICINE

## 2021-06-10 PROCEDURE — 99220 PR INITIAL OBSERVATION CARE/DAY 70 MINUTES: CPT | Performed by: FAMILY MEDICINE

## 2021-06-10 RX ORDER — ESCITALOPRAM OXALATE 10 MG/1
10 TABLET ORAL DAILY
COMMUNITY

## 2021-06-10 RX ORDER — SODIUM CHLORIDE, SODIUM LACTATE, POTASSIUM CHLORIDE, CALCIUM CHLORIDE 600; 310; 30; 20 MG/100ML; MG/100ML; MG/100ML; MG/100ML
75 INJECTION, SOLUTION INTRAVENOUS CONTINUOUS
Status: ACTIVE | OUTPATIENT
Start: 2021-06-10 | End: 2021-06-11

## 2021-06-10 RX ORDER — ASPIRIN 81 MG/1
81 TABLET ORAL DAILY
COMMUNITY

## 2021-06-10 RX ORDER — ACETAMINOPHEN 650 MG/1
650 SUPPOSITORY RECTAL EVERY 4 HOURS PRN
Status: DISCONTINUED | OUTPATIENT
Start: 2021-06-10 | End: 2021-06-11

## 2021-06-10 RX ORDER — ASPIRIN 325 MG
325 TABLET ORAL DAILY
Status: DISCONTINUED | OUTPATIENT
Start: 2021-06-10 | End: 2021-06-11

## 2021-06-10 RX ORDER — SODIUM CHLORIDE 0.9 % (FLUSH) 0.9 %
10 SYRINGE (ML) INJECTION EVERY 12 HOURS SCHEDULED
Status: DISCONTINUED | OUTPATIENT
Start: 2021-06-10 | End: 2021-06-11

## 2021-06-10 RX ORDER — ONDANSETRON 2 MG/ML
4 INJECTION INTRAMUSCULAR; INTRAVENOUS ONCE
Status: COMPLETED | OUTPATIENT
Start: 2021-06-10 | End: 2021-06-10

## 2021-06-10 RX ORDER — POTASSIUM CHLORIDE 20 MEQ/1
20 TABLET, EXTENDED RELEASE ORAL 3 TIMES WEEKLY
COMMUNITY
End: 2021-06-13 | Stop reason: HOSPADM

## 2021-06-10 RX ORDER — LEVETIRACETAM 500 MG/1
500 TABLET ORAL 2 TIMES DAILY
Status: ON HOLD | COMMUNITY
End: 2021-06-13 | Stop reason: SDUPTHER

## 2021-06-10 RX ORDER — LEVOTHYROXINE SODIUM 0.1 MG/1
100 TABLET ORAL DAILY
COMMUNITY

## 2021-06-10 RX ORDER — ACETAMINOPHEN 325 MG/1
650 TABLET ORAL EVERY 4 HOURS PRN
Status: DISCONTINUED | OUTPATIENT
Start: 2021-06-10 | End: 2021-06-11

## 2021-06-10 RX ORDER — LEVETIRACETAM 500 MG/1
1000 TABLET ORAL 2 TIMES DAILY
Status: DISCONTINUED | OUTPATIENT
Start: 2021-06-10 | End: 2021-06-11

## 2021-06-10 RX ORDER — MULTIPLE VITAMINS W/ MINERALS TAB 9MG-400MCG
1 TAB ORAL DAILY
COMMUNITY

## 2021-06-10 RX ORDER — SODIUM CHLORIDE 0.9 % (FLUSH) 0.9 %
10 SYRINGE (ML) INJECTION AS NEEDED
Status: DISCONTINUED | OUTPATIENT
Start: 2021-06-10 | End: 2021-06-10

## 2021-06-10 RX ORDER — CYANOCOBALAMIN, ISOPROPYL ALCOHOL 1000MCG/ML
1000 KIT INJECTION
COMMUNITY

## 2021-06-10 RX ORDER — PRAZOSIN HYDROCHLORIDE 5 MG/1
5 CAPSULE ORAL NIGHTLY
COMMUNITY

## 2021-06-10 RX ORDER — ATORVASTATIN CALCIUM 40 MG/1
80 TABLET, FILM COATED ORAL NIGHTLY
Status: DISCONTINUED | OUTPATIENT
Start: 2021-06-10 | End: 2021-06-13 | Stop reason: HOSPADM

## 2021-06-10 RX ORDER — ERGOCALCIFEROL 1.25 MG/1
50000 CAPSULE ORAL WEEKLY
COMMUNITY

## 2021-06-10 RX ORDER — SODIUM CHLORIDE 0.9 % (FLUSH) 0.9 %
10 SYRINGE (ML) INJECTION AS NEEDED
Status: DISCONTINUED | OUTPATIENT
Start: 2021-06-10 | End: 2021-06-11

## 2021-06-10 RX ORDER — ASPIRIN 300 MG/1
300 SUPPOSITORY RECTAL DAILY
Status: DISCONTINUED | OUTPATIENT
Start: 2021-06-10 | End: 2021-06-11

## 2021-06-10 RX ORDER — NALOXONE HYDROCHLORIDE 1 MG/ML
INJECTION INTRAMUSCULAR; INTRAVENOUS; SUBCUTANEOUS
Status: COMPLETED
Start: 2021-06-10 | End: 2021-06-10

## 2021-06-10 RX ADMIN — SODIUM CHLORIDE 1000 ML: 9 INJECTION, SOLUTION INTRAVENOUS at 09:28

## 2021-06-10 RX ADMIN — IOPAMIDOL 100 ML: 755 INJECTION, SOLUTION INTRAVENOUS at 10:28

## 2021-06-10 RX ADMIN — SODIUM CHLORIDE, POTASSIUM CHLORIDE, SODIUM LACTATE AND CALCIUM CHLORIDE 75 ML/HR: 600; 310; 30; 20 INJECTION, SOLUTION INTRAVENOUS at 18:47

## 2021-06-10 RX ADMIN — CEFTRIAXONE SODIUM 2 G: 2 INJECTION, POWDER, FOR SOLUTION INTRAMUSCULAR; INTRAVENOUS at 09:33

## 2021-06-10 RX ADMIN — ASPIRIN 300 MG: 300 SUPPOSITORY RECTAL at 22:45

## 2021-06-10 RX ADMIN — ONDANSETRON 4 MG: 2 INJECTION INTRAMUSCULAR; INTRAVENOUS at 12:55

## 2021-06-10 RX ADMIN — SODIUM CHLORIDE, PRESERVATIVE FREE 10 ML: 5 INJECTION INTRAVENOUS at 22:45

## 2021-06-10 RX ADMIN — NALOXONE HYDROCHLORIDE 2 MG: 1 INJECTION PARENTERAL at 08:22

## 2021-06-10 NOTE — H&P
[facility]  Date: 6/10/2021   Patient Name: Deepti Ramirez  : 1955  MRN: [id number]  Primary Care Physician:  Sweetie Duque APRN  Date of admission: 6/10/2021    Subjective   Subjective     Chief Complaint:   Chief Complaint   Patient presents with   • Stroke   Slurred speech, AMS, Restless    HPI:    Deepti Ramirez is a 65 y.o. female presented to the ER with chief complaint of Slurred speech, AMS, Restless.  History of presenting illness was obtained after discussion from Dr. Alfred, as well as chart review, the patient was unable to provide any sort of constructive review of systems or history, she had an expressive aphasia, she could not stay focused.  The patient was last known well several days ago, for the past 2 days she has been increasingly restless,  provided information that when she went to sleep early last night around , she had awoken 6 AM on 6/10/2021 vomiting, she was minimally responsive and he called EMS.  He noted her speech was slurred.  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  In the emergency room she was found to have behavioral changes, somnolent, snoring, labs and imaging were obtained, CT of her head revealed patchy areas of low-attenuation within the periventricular white matter bilateral compatible small vessel ischemic disease chronic, urinalysis suspicious for urinary tract infection, white count was normal, CT of her head revealed creatinine was 0.99.  Given her findings of expressive aphasia and concern for encephalopathy versus stroke, hospital service was requested to admit and further manage.  Personal History     Past Medical History:  Past Medical History:   Diagnosis Date   • Anemia    • Anxiety    • Arthritis    • Asthma    • Cancer (CMS/HCC)    • Chronic obstructive pulmonary disease (CMS/HCC)    • Depression    •  Hyperlipidemia    • Meningitis 10/2020   • Seasonal allergies    • Sinus infection    • Thyroid disorder    (COPD  Hypothyroidism  Bipolar disorder with depression  Vulvar cancer s/p vulvectomy  History of lymphocytic meningitis  History of breast  History of pulmonary embolism  History of pancreatitis  Anxiety  Current tobacco smoker  Hypothyroidism  Bipolar disorder with depression  Moderate pulmonary hypertension  Hypertensive heart disease with left ventricular hypertrophy  Chronic anemia  Osteoarthritis of multiple joints  Seasonal allergies  Sinus infection)    Past Surgical History:  Past Surgical History:   Procedure Laterality Date   • COLONOSCOPY  04/22/2019   • OTHER SURGICAL HISTORY      Metal implants    • TEETH EXTRACTION      2 teeth pulled    • VULVECTOMY  2004     Family History:   Family History   Problem Relation Age of Onset   • Arthritis Mother    • Hypertension Mother    • Diabetes Mother    • Heart disease Father    • Hypertension Father    • Diabetes Father    • Diabetes Sister        Social History:  Social History     Socioeconomic History   • Marital status:      Spouse name: Not on file   • Number of children: Not on file   • Years of education: Not on file   • Highest education level: Not on file   Tobacco Use   • Smoking status: Current Every Day Smoker     Packs/day: 1.00     Years: 30.00     Pack years: 30.00   Substance and Sexual Activity   • Alcohol use: Never       Home Medications:  Cyanocobalamin, aspirin, escitalopram, levETIRAcetam, levothyroxine, multivitamin with minerals, potassium chloride, prazosin, and vitamin D    Allergies:  Allergies   Allergen Reactions   • Sulfa Antibiotics Unknown - Low Severity       Review of Systems    Unable to obtain history due to patient's altered mental status and expressive aphasia, limited review of systems documented above under HPI    Objective   Objective     Vitals:   Temp:  [97.8 °F (36.6 °C)] 97.8 °F (36.6 °C)  Heart Rate:   "[54-67] 64  Resp:  [16] 16  BP: ()/(60-66) 102/60    Physical Exam    Constitutional: Awake, no acute distress, able to make attempts to make eye contact, confused, unable to answer questions, she continuously repeats the words \"going Dr\"   Eyes: Pupils are constricted, 2 mm bilateral,  cannot follow instructions to obtain EOMI, she rotates her eyes spontaneously, pupils are not reactive to light   HENT: NCAT, mucous membranes moist   Neck: Supple, no thyromegaly, no lymphadenopathy   Respiratory: Clear to auscultation bilaterally, nonlabored respirations    Cardiovascular: RRR, no murmurs, rubs, or gallops, palpable pedal pulses bilaterally   Gastrointestinal: Positive bowel sounds, soft, nontender, nondistended   Musculoskeletal: No bilateral ankle edema, no clubbing or cyanosis to extremities, full range of motion, normal strength 4 extremities   Psychiatric: Unable to obtain as she cannot communicate   Neurologic: Oriented x 1, strength symmetric in all extremities, unable to clearly assess cranial nerves, she has aphasia, expressive   Skin: No rashes, no visible skin lesions, no signs of cellulitis   Genitourinary: No suprapubic tenderness, no Rossi catheter    Result Review    Result Review:  I have personally reviewed the results from the time of this admission to 6/10/2021 11:59 EDT and agree with these findings:  [x]  Laboratory  []  Microbiology  [x]  Radiology  [x]  EKG/Telemetry   []  Cardiology/Vascular   []  Pathology  [x]  Old records  []  Other:      Assessment/Plan   Assessment / Plan     Assessment/Plan:   Assessment:  Suspected ischemic stroke  Expressive aphasia  Encephalopathy; metabolic; due to UTI  Complicated UTI in female gram-positive versus gram-negative  Recent history of lymphocytic meningitis  Seizure disorder  COPD not in exacerbation  Current smoker  PTSD  Hypothyroidism  Chronic anemia  Anxiety  Depression  Dyslipidemia    Plan:  Labs and imaging reviewed  Hospitalize for " further neuro monitoring  MRI brain pending  Neurochecks per protocol  Echo pending  Follow stroke pathway  PT/OT/speech  Diet per speech  1 g ceftriaxone daily  Follow urine culture  Aspirin, statin  Follow-up a.m. labs for fasting lipids  Telemetry monitoring  Tobacco cessation; nicotine patch  Carotid Doppler study  Straight cath at unable to urinate  Hold Keppra  Follow-up Keppra level  Neurology consulted discussed with Dr. Whelan, follow-up recs  A.m. labs  Full code  VTE prophylaxis SCDs  Clinical course to dictate further management  Discussed with nurse at the bedside, ER Dr. Alfred        Past Medical History:   Diagnosis Date   • Anemia    • Anxiety    • Arthritis    • Asthma    • Cancer (CMS/HCC)    • Chronic obstructive pulmonary disease (CMS/HCC)    • Depression    • Hyperlipidemia    • Meningitis 10/2020   • Seasonal allergies    • Sinus infection    • Thyroid disorder            DVT prophylaxis:  No DVT prophylaxis order currently exists.    CODE STATUS:    Level Of Support Discussed With: Patient  Code Status: CPR  Medical Interventions (Level of Support Prior to Arrest): Full      Admission Status:  I believe this patient meets observation status until we have further work-up completed as well as monitoring for changes in mental status.    Electronically signed by Rishabh Vela MD, 06/10/21, 11:59 AM EDT.

## 2021-06-10 NOTE — CONSULTS
[facility]   Neurology Consult Note    Patient Name: Deepti Ramirez  : 1955  MRN: [id number]  Primary Care Physician:  Sweetie Duque APRN  Referring Physician: No ref. provider found  Date of admission: 6/10/2021    Subjective   Subjective     Reason for Consult/ Chief Complaint: Expressive difficulties.    HPI:  Deepti Ramirez is a 65 y.o. female evaluated for expressive difficulties. The patient is unable to give a history. According to review of the records the patient has been restless for the past 2 days and the  noted that when she awoke this morning she was vomiting and minimally responsive and called EMS. Her speech was slurred.    The patient was seen for similar event and March in which her main complaint was impaired speech and off balance. She was seen in the emergency room and documentation was limited. It was noted at that time that the patient woke up with symptoms at 10:00 in the morning and her  noticed her speech to be affected in the evening. The daughter also noted that the patient was unsteady as she was coming down the stairs. She had an MRI of the brain during that admission and it did not show any acute abnormalities. There was moderate white matter changes. Head and neck CTA was unremarkable for large vessel stenosis. The diagnosis was acute transient ischemic attack. The patient was instructed not to smoke and is to start taking aspirin.    She was admitted At that time on 2020 and discharge 2024 acute metabolic encephalopathy, posterior reversible encephalopathy syndrome. She also was diagnosed to have lymphocytic meningitis. She has a history of bipolar depression. She had a spinal tap showing the CSF colorless light red, RBC 1600, total WBC 23, CSF neutrophils 29, lymphocytes 57 and monocytes 14. She was seen by another neurologist at that time and was also diagnosed to have focal motor status epilepticus.    Home medications  included levetiracetam to this admission.        Personal History     Past Medical History:   Diagnosis Date   • Anemia    • Anxiety    • Arthritis    • Asthma    • Cancer (CMS/HCC)    • Chronic obstructive pulmonary disease (CMS/HCC)    • Depression    • Hyperlipidemia    • Meningitis 10/2020   • Seasonal allergies    • Sinus infection    • Thyroid disorder        Past Surgical History:   Procedure Laterality Date   • COLONOSCOPY  04/22/2019   • OTHER SURGICAL HISTORY      Metal implants    • TEETH EXTRACTION      2 teeth pulled    • VULVECTOMY  2004       Family History: family history includes Arthritis in her mother; Diabetes in her father, mother, and sister; Heart disease in her father; Hypertension in her father and mother. Otherwise pertinent FHx was reviewed and not pertinent to current issue.    Social History:  reports that she has been smoking. She has a 30.00 pack-year smoking history. She does not have any smokeless tobacco history on file. She reports that she does not drink alcohol.    Home Medications:  Cyanocobalamin, aspirin, escitalopram, levETIRAcetam, levothyroxine, multivitamin with minerals, potassium chloride, prazosin, and vitamin D    Allergies:  Allergies   Allergen Reactions   • Sulfa Antibiotics Unknown - Low Severity       Objective    Objective     Vitals:   Temp:  [97.8 °F (36.6 °C)] 97.8 °F (36.6 °C)  Heart Rate:  [54-67] 65  Resp:  [16] 16  BP: ()/(60-66) 102/60    Physical Exam: She is alert and mumbling incomprehensible words and sentences. She does have intermittent moments in which she is able to carry on a sentence. When I asked her where her  was she told me he was in [place] hitting a car fix. When asked her again she was mumbling words. She can name pen, watch, repeat, read written words. I told her to read close your eyes and she read it but did not close her eyes. She can read what is your name. When asked about her name she can tell me her name. She is  able to follow commands such as show me two fingers, touch your nose with your left hand, wiggle your toes, and was able to cooperate for visual field testing. Most of her sentences however are garbled and mumbled. Visual fields are full confrontation, EOMs are full and directions of gaze, facial strength is full, soft palate elevation and tongue are normal. There is no weakness of the upper or lower extremities in major muscle testing. Five ligaments are intact. Flexes are symmetrically decreased. Heart is regular with a normal in rate.      Result Review    Result Review:  I have personally reviewed the results from the time of this admission to 6/10/2021 17:27 EDT and agree with these findings:  []  Laboratory  []  Microbiology  []  Radiology  []  EKG/Telemetry   []  Cardiology/Vascular   []  Pathology  []  Old records  []  Other:      Assessment/Plan   Assessment / Plan   Active Hospital Problems:  Active Hospital Problems    Diagnosis    • **Ischemic stroke (CMS/HCC)    • Complicated UTI (urinary tract infection)          Plan: Her language presentation is unusual however there is more expressive difficulty that is inconsistent. An MRI of the brain will be performed. Other diagnosis will be entertained if the MRI of the brain does not show an acute infarction such as an autoimmune encephalitis since she has had a lumbar puncture in the past showing elevated WBCs. I will order an EEG to make further recommendations depending on the testing results.    Total time spent with the patient and coordinating patient care was 70 minutes.    electronically signed by Fabiano Whelan MD, 06/10/21, 5:27 PM EDT.

## 2021-06-10 NOTE — ED NOTES
In and out catheter performed per [facility] policy. Specimen obtained and sent to Emely Mcmullen RN  06/10/21 0900

## 2021-06-10 NOTE — ED PROVIDER NOTES
Subjective   History of Present Illness    Review of Systems    Past Medical History:   Diagnosis Date   • Anemia    • Anxiety    • Arthritis    • Asthma    • Cancer (CMS/HCC)    • Chronic obstructive pulmonary disease (CMS/HCC)    • Depression    • Hyperlipidemia    • Meningitis 10/2020   • Seasonal allergies    • Sinus infection    • Thyroid disorder        Allergies   Allergen Reactions   • Sulfa Antibiotics Unknown - Low Severity       Past Surgical History:   Procedure Laterality Date   • COLONOSCOPY  04/22/2019   • OTHER SURGICAL HISTORY      Metal implants    • TEETH EXTRACTION      2 teeth pulled    • VULVECTOMY  2004       Family History   Problem Relation Age of Onset   • Arthritis Mother    • Heart disease Father    • Diabetes Sister        Social History     Socioeconomic History   • Marital status:      Spouse name: Not on file   • Number of children: Not on file   • Years of education: Not on file   • Highest education level: Not on file   Tobacco Use   • Smoking status: Current Every Day Smoker     Packs/day: 1.00   Substance and Sexual Activity   • Alcohol use: Never           Objective   Physical Exam    ECG 12 Lead      Date/Time: 6/10/2021 8:23 AM  Performed by: Jose Armando Alfred DO  Authorized by: Jose Armando Alfred DO                    ED Course                                           MDM    Final diagnoses:   None       ED Disposition  ED Disposition     None          No follow-up provider specified.       Medication List      No changes were made to your prescriptions during this visit.

## 2021-06-10 NOTE — ED TRIAGE NOTES
"Per EMS  patient went to bed around 2000 6/9/2021 in normal condition. Per ems  awoke at 0615 and found patient vomiting and \"not acting right\" and not speaking.   "

## 2021-06-11 ENCOUNTER — APPOINTMENT (OUTPATIENT)
Dept: NEUROLOGY | Facility: HOSPITAL | Age: 66
End: 2021-06-11

## 2021-06-11 ENCOUNTER — APPOINTMENT (OUTPATIENT)
Dept: INTERVENTIONAL RADIOLOGY/VASCULAR | Facility: HOSPITAL | Age: 66
End: 2021-06-11

## 2021-06-11 ENCOUNTER — APPOINTMENT (OUTPATIENT)
Dept: CARDIOLOGY | Facility: HOSPITAL | Age: 66
End: 2021-06-11

## 2021-06-11 PROBLEM — I63.9 ISCHEMIC STROKE: Status: RESOLVED | Noted: 2021-06-10 | Resolved: 2021-06-11

## 2021-06-11 PROBLEM — G93.40 ENCEPHALOPATHY ACUTE: Status: ACTIVE | Noted: 2021-06-11

## 2021-06-11 PROBLEM — G93.41 ENCEPHALOPATHY, METABOLIC: Status: ACTIVE | Noted: 2021-06-11

## 2021-06-11 LAB
APPEARANCE CSF: CLEAR
BH CV ECHO MEAS - AO ROOT DIAM: 2.1 CM
BH CV ECHO MEAS - EDV(MOD-SP2): 38 ML
BH CV ECHO MEAS - EDV(MOD-SP4): 52 ML
BH CV ECHO MEAS - ESV(MOD-SP2): 14 ML
BH CV ECHO MEAS - ESV(MOD-SP4): 24 ML
BH CV ECHO MEAS - IVSD: 0.9 CM
BH CV ECHO MEAS - LA DIMENSION(2D): 2.7 CM
BH CV ECHO MEAS - LAT PEAK E' VEL: 6 CM/SEC
BH CV ECHO MEAS - LVIDD: 4.7 CM
BH CV ECHO MEAS - LVIDS: 3.5 CM
BH CV ECHO MEAS - LVPWD: 0.9 CM
BH CV ECHO MEAS - MED PEAK E' VEL: 4 CM/SEC
BH CV ECHO MEAS - MV A MAX VEL: 72 CM/SEC
BH CV ECHO MEAS - MV DEC TIME: 180 MSEC
BH CV ECHO MEAS - MV E MAX VEL: 57 CM/SEC
BH CV ECHO MEAS - MV E/A: 0.8
BH CV ECHO MEASUREMENTS AVERAGE E/E' RATIO: 11.4
BH CV XLRA MEAS LEFT CAROTID BULB EDV: 16.5 CM/SEC
BH CV XLRA MEAS LEFT CAROTID BULB PSV: 66.7 CM/SEC
BH CV XLRA MEAS LEFT DIST CCA EDV: 22.4 CM/SEC
BH CV XLRA MEAS LEFT DIST CCA PSV: 80.1 CM/SEC
BH CV XLRA MEAS LEFT DIST ICA EDV: 23 CM/SEC
BH CV XLRA MEAS LEFT DIST ICA PSV: 72.1 CM/SEC
BH CV XLRA MEAS LEFT MID ICA EDV: 25.5 CM/SEC
BH CV XLRA MEAS LEFT MID ICA PSV: 75.8 CM/SEC
BH CV XLRA MEAS LEFT PROX CCA EDV: 18.6 CM/SEC
BH CV XLRA MEAS LEFT PROX CCA PSV: 95.7 CM/SEC
BH CV XLRA MEAS LEFT PROX ECA EDV: 18 CM/SEC
BH CV XLRA MEAS LEFT PROX ECA PSV: 96.7 CM/SEC
BH CV XLRA MEAS LEFT PROX ICA EDV: 17 CM/SEC
BH CV XLRA MEAS LEFT PROX ICA PSV: 67.3 CM/SEC
BH CV XLRA MEAS LEFT VERTEBRAL A EDV: 16.8 CM/SEC
BH CV XLRA MEAS LEFT VERTEBRAL A PSV: 53.4 CM/SEC
BH CV XLRA MEAS RIGHT CAROTID BULB EDV: 12.3 CM/SEC
BH CV XLRA MEAS RIGHT CAROTID BULB PSV: 46.4 CM/SEC
BH CV XLRA MEAS RIGHT DIST CCA EDV: 16.8 CM/SEC
BH CV XLRA MEAS RIGHT DIST CCA PSV: 80.8 CM/SEC
BH CV XLRA MEAS RIGHT DIST ICA EDV: 23 CM/SEC
BH CV XLRA MEAS RIGHT DIST ICA PSV: 91.9 CM/SEC
BH CV XLRA MEAS RIGHT MID ICA EDV: 20.5 CM/SEC
BH CV XLRA MEAS RIGHT MID ICA PSV: 69 CM/SEC
BH CV XLRA MEAS RIGHT PROX CCA EDV: 24.2 CM/SEC
BH CV XLRA MEAS RIGHT PROX CCA PSV: 95.1 CM/SEC
BH CV XLRA MEAS RIGHT PROX ECA EDV: 13 CM/SEC
BH CV XLRA MEAS RIGHT PROX ECA PSV: 109 CM/SEC
BH CV XLRA MEAS RIGHT PROX ICA EDV: 16.1 CM/SEC
BH CV XLRA MEAS RIGHT PROX ICA PSV: 46.2 CM/SEC
BH CV XLRA MEAS RIGHT VERTEBRAL A EDV: 17.4 CM/SEC
BH CV XLRA MEAS RIGHT VERTEBRAL A PSV: 55.3 CM/SEC
CHOLEST SERPL-MCNC: 121 MG/DL (ref 0–200)
COLOR CSF: COLORLESS
GLUCOSE BLDC GLUCOMTR-MCNC: 102 MG/DL (ref 70–130)
GLUCOSE BLDC GLUCOMTR-MCNC: 103 MG/DL (ref 70–130)
GLUCOSE CSF-MCNC: 66 MG/DL (ref 40–70)
HBA1C MFR BLD: 5.84 % (ref 4.8–5.6)
HDLC SERPL-MCNC: 41 MG/DL (ref 40–60)
HOLD SPECIMEN: NORMAL
IVRT: 66 MSEC
LDLC SERPL CALC-MCNC: 59 MG/DL (ref 0–100)
LDLC/HDLC SERPL: 1.38 {RATIO}
LEFT ATRIUM VOLUME INDEX: 17 ML/M2
LYMPHOCYTES NFR CSF MANUAL: 82 % (ref 40–80)
MAXIMAL PREDICTED HEART RATE: 154 BPM
MAXIMAL PREDICTED HEART RATE: 154 BPM
MONOCYTES NFR CSF MANUAL: 18 % (ref 15–45)
NUC CELL # CSF MANUAL: 5 /MM3 (ref 0–5)
PROT CSF-MCNC: 108.3 MG/DL (ref 15–45)
RBC # CSF MANUAL: 0 /MM3
STRESS TARGET HR: 131 BPM
STRESS TARGET HR: 131 BPM
T4 FREE SERPL-MCNC: 1.43 NG/DL (ref 0.93–1.7)
TRIGL SERPL-MCNC: 117 MG/DL (ref 0–150)
TUBE # CSF: 3
VLDLC SERPL-MCNC: 21 MG/DL (ref 5–40)
XANTHOCHROMIA FLD QL: NORMAL

## 2021-06-11 PROCEDURE — 84157 ASSAY OF PROTEIN OTHER: CPT | Performed by: PSYCHIATRY & NEUROLOGY

## 2021-06-11 PROCEDURE — 95816 EEG AWAKE AND DROWSY: CPT | Performed by: PSYCHIATRY & NEUROLOGY

## 2021-06-11 PROCEDURE — 87070 CULTURE OTHR SPECIMN AEROBIC: CPT | Performed by: PSYCHIATRY & NEUROLOGY

## 2021-06-11 PROCEDURE — 84439 ASSAY OF FREE THYROXINE: CPT | Performed by: FAMILY MEDICINE

## 2021-06-11 PROCEDURE — 82040 ASSAY OF SERUM ALBUMIN: CPT | Performed by: PSYCHIATRY & NEUROLOGY

## 2021-06-11 PROCEDURE — 009U3ZX DRAINAGE OF SPINAL CANAL, PERCUTANEOUS APPROACH, DIAGNOSTIC: ICD-10-PCS | Performed by: RADIOLOGY

## 2021-06-11 PROCEDURE — 99214 OFFICE O/P EST MOD 30 MIN: CPT | Performed by: PSYCHIATRY & NEUROLOGY

## 2021-06-11 PROCEDURE — 92610 EVALUATE SWALLOWING FUNCTION: CPT

## 2021-06-11 PROCEDURE — 93306 TTE W/DOPPLER COMPLETE: CPT | Performed by: INTERNAL MEDICINE

## 2021-06-11 PROCEDURE — 82784 ASSAY IGA/IGD/IGG/IGM EACH: CPT | Performed by: PSYCHIATRY & NEUROLOGY

## 2021-06-11 PROCEDURE — 80061 LIPID PANEL: CPT | Performed by: FAMILY MEDICINE

## 2021-06-11 PROCEDURE — 86255 FLUORESCENT ANTIBODY SCREEN: CPT | Performed by: PSYCHIATRY & NEUROLOGY

## 2021-06-11 PROCEDURE — 83916 OLIGOCLONAL BANDS: CPT | Performed by: PSYCHIATRY & NEUROLOGY

## 2021-06-11 PROCEDURE — 93306 TTE W/DOPPLER COMPLETE: CPT

## 2021-06-11 PROCEDURE — 97161 PT EVAL LOW COMPLEX 20 MIN: CPT

## 2021-06-11 PROCEDURE — G0378 HOSPITAL OBSERVATION PER HR: HCPCS

## 2021-06-11 PROCEDURE — 83873 ASSAY OF CSF PROTEIN: CPT | Performed by: PSYCHIATRY & NEUROLOGY

## 2021-06-11 PROCEDURE — 82945 GLUCOSE OTHER FLUID: CPT | Performed by: PSYCHIATRY & NEUROLOGY

## 2021-06-11 PROCEDURE — 95816 EEG AWAKE AND DROWSY: CPT

## 2021-06-11 PROCEDURE — 82042 OTHER SOURCE ALBUMIN QUAN EA: CPT | Performed by: PSYCHIATRY & NEUROLOGY

## 2021-06-11 PROCEDURE — 82962 GLUCOSE BLOOD TEST: CPT

## 2021-06-11 PROCEDURE — B01B1ZZ FLUOROSCOPY OF SPINAL CORD USING LOW OSMOLAR CONTRAST: ICD-10-PCS | Performed by: RADIOLOGY

## 2021-06-11 PROCEDURE — 89051 BODY FLUID CELL COUNT: CPT | Performed by: PSYCHIATRY & NEUROLOGY

## 2021-06-11 PROCEDURE — 87015 SPECIMEN INFECT AGNT CONCNTJ: CPT | Performed by: PSYCHIATRY & NEUROLOGY

## 2021-06-11 PROCEDURE — 25010000002 CEFTRIAXONE PER 250 MG: Performed by: FAMILY MEDICINE

## 2021-06-11 PROCEDURE — 62328 DX LMBR SPI PNXR W/FLUOR/CT: CPT | Performed by: RADIOLOGY

## 2021-06-11 PROCEDURE — 87205 SMEAR GRAM STAIN: CPT | Performed by: PSYCHIATRY & NEUROLOGY

## 2021-06-11 PROCEDURE — 99226 PR SBSQ OBSERVATION CARE/DAY 35 MINUTES: CPT | Performed by: FAMILY MEDICINE

## 2021-06-11 RX ORDER — LIDOCAINE HYDROCHLORIDE 20 MG/ML
INJECTION, SOLUTION INFILTRATION; PERINEURAL
Status: DISPENSED
Start: 2021-06-11 | End: 2021-06-11

## 2021-06-11 RX ORDER — ASPIRIN 81 MG/1
81 TABLET ORAL DAILY
Status: DISCONTINUED | OUTPATIENT
Start: 2021-06-11 | End: 2021-06-13 | Stop reason: HOSPADM

## 2021-06-11 RX ORDER — ESCITALOPRAM OXALATE 10 MG/1
10 TABLET ORAL DAILY
Status: DISCONTINUED | OUTPATIENT
Start: 2021-06-11 | End: 2021-06-13 | Stop reason: HOSPADM

## 2021-06-11 RX ORDER — SODIUM CHLORIDE, SODIUM LACTATE, POTASSIUM CHLORIDE, CALCIUM CHLORIDE 600; 310; 30; 20 MG/100ML; MG/100ML; MG/100ML; MG/100ML
75 INJECTION, SOLUTION INTRAVENOUS CONTINUOUS
Status: DISCONTINUED | OUTPATIENT
Start: 2021-06-11 | End: 2021-06-13 | Stop reason: HOSPADM

## 2021-06-11 RX ORDER — LEVOTHYROXINE SODIUM 0.1 MG/1
100 TABLET ORAL
Status: DISCONTINUED | OUTPATIENT
Start: 2021-06-11 | End: 2021-06-13 | Stop reason: HOSPADM

## 2021-06-11 RX ORDER — MULTIPLE VITAMINS W/ MINERALS TAB 9MG-400MCG
1 TAB ORAL DAILY
Status: DISCONTINUED | OUTPATIENT
Start: 2021-06-11 | End: 2021-06-13 | Stop reason: HOSPADM

## 2021-06-11 RX ORDER — LEVETIRACETAM 500 MG/1
500 TABLET ORAL EVERY 12 HOURS SCHEDULED
Status: DISCONTINUED | OUTPATIENT
Start: 2021-06-11 | End: 2021-06-13 | Stop reason: HOSPADM

## 2021-06-11 RX ADMIN — SODIUM CHLORIDE, POTASSIUM CHLORIDE, SODIUM LACTATE AND CALCIUM CHLORIDE 75 ML/HR: 600; 310; 30; 20 INJECTION, SOLUTION INTRAVENOUS at 14:52

## 2021-06-11 RX ADMIN — SODIUM CHLORIDE, PRESERVATIVE FREE 10 ML: 5 INJECTION INTRAVENOUS at 08:07

## 2021-06-11 RX ADMIN — LEVETIRACETAM 500 MG: 500 TABLET, FILM COATED ORAL at 20:46

## 2021-06-11 RX ADMIN — ASPIRIN 81 MG: 81 TABLET, COATED ORAL at 08:07

## 2021-06-11 RX ADMIN — CEFTRIAXONE 1 G: 10 INJECTION, POWDER, FOR SOLUTION INTRAVENOUS at 08:07

## 2021-06-11 RX ADMIN — LEVETIRACETAM 500 MG: 500 TABLET, FILM COATED ORAL at 08:07

## 2021-06-11 RX ADMIN — ATORVASTATIN CALCIUM 80 MG: 40 TABLET, FILM COATED ORAL at 20:46

## 2021-06-11 RX ADMIN — LEVOTHYROXINE SODIUM 100 MCG: 100 TABLET ORAL at 08:07

## 2021-06-11 RX ADMIN — ESCITALOPRAM 10 MG: 10 TABLET, FILM COATED ORAL at 08:07

## 2021-06-11 NOTE — PROGRESS NOTES
[facility]   Neurology Progress Note    Patient Name: Deepti Ramirez  : 1955  MRN: [id number]  Primary Care Physician:  Sweetie Duque APRN  Referring Physician: No ref. provider found  Date of admission: 6/10/2021    Subjective   Subjective     Reason for Consult/ Chief Complaint: Follow-up visit for her language difficulties.    HPI:  Deepti Ramirez is a 65 y.o. female follow-up visit.  Her MRI of the brain did not show any evidence of acute infarction.  She thinks she is better today than yesterday but she does not remember anything from yesterday or the day before.  She does not remember why she came to the hospital.  She does not remember seeing me yesterday or that she was acting strangely yesterday and needed a sitter in the emergency room.  She states that she has had psychiatric problems in the past when she was 15 years old and was admitted to a psychiatric hospital but has been stable since that time.  She has not had any recurrent hospitalizations.  She denies being depressed.  She denies being anxious at this time.  She states that she is not in any stress however she states that I am stressing her out by asking her a bunch of questions.  She states that she sees Dr. Powell as her neurologist.  She saw him several months ago.  She does not know the kind of seizure she has.    Objective     Vitals:   Temp:  [97.4 °F (36.3 °C)-98.4 °F (36.9 °C)] 98.4 °F (36.9 °C)  Heart Rate:  [64-78] 77  Resp:  [16-20] 20  BP: (115-153)/(54-79) 126/71  Flow (L/min):  [2] 2    Physical Exam: She is alert, fluent, phasic, follows commands well.  He has no expressive difficulties.  She is able to name and repeat without difficulty today.  She has no language deficits and can carry on a conversation although limited.  She is able to follow commands.  Patient feels full confrontation, ems for directions gaze, facial strength is full.  There is no weakness of the upper or lower extremities.      Result  Review    Result Review:  I have personally reviewed the results from the time of this admission to 6/11/2021 12:05 EDT and agree with these findings:  []  Laboratory  []  Microbiology  [x]  Radiology  []  EKG/Telemetry   []  Cardiology/Vascular   []  Pathology  []  Old records  []  Other:      Assessment/Plan   Assessment / Plan   Active Hospital Problems:  Active Hospital Problems    Diagnosis    • Encephalopathy, metabolic    • Encephalopathy acute    • Complicated UTI (urinary tract infection)          Plan: I discussed with her that I would like for her to get a lumbar puncture under fluoroscopy she understood this.  She is agreeable to procedure.  She has a history of diagnosed encephalitis in the past by Dr. Powell and I want to see if there is any markers of inflammation in the spinal fluid to suggest that she has a paraneoplastic syndrome causing her recurrent changes in mental status.  At this time she has been diagnosed to have metabolic encephalopathy.  I will be out for the weekend.  I will review that testing remotely.  Dr. Powell will be on call this weekend.  Total time spent with the patient and coordinating patient care was 40 minutes.    electronically signed by Fabiano Whelan MD, 06/11/21, 12:05 PM EDT.

## 2021-06-11 NOTE — PLAN OF CARE
Goal Outcome Evaluation:              Outcome Summary: PT HAD LP TODAY, STROKE ORDERS DC, VITAL SIGNS STABLE. LR,RN

## 2021-06-11 NOTE — PLAN OF CARE
Problem: Skin Injury Risk Increased  Goal: Skin Health and Integrity  Outcome: Ongoing, Progressing     Problem: Fall Injury Risk  Goal: Absence of Fall and Fall-Related Injury  Outcome: Ongoing, Progressing  Intervention: Promote Injury-Free Environment  Recent Flowsheet Documentation  Taken 6/11/2021 0423 by Echo Cai RN  Safety Promotion/Fall Prevention: safety round/check completed  Taken 6/11/2021 0200 by Echo Cai RN  Safety Promotion/Fall Prevention: safety round/check completed  Taken 6/11/2021 0000 by Echo Cai RN  Safety Promotion/Fall Prevention: safety round/check completed  Taken 6/10/2021 2200 by Echo Cai RN  Safety Promotion/Fall Prevention: safety round/check completed  Taken 6/10/2021 2000 by Echo Cai RN  Safety Promotion/Fall Prevention: safety round/check completed  Taken 6/10/2021 1900 by Echo Cai RN  Safety Promotion/Fall Prevention: safety round/check completed     Problem: Adult Inpatient Plan of Care  Goal: Plan of Care Review  Patient rested well, still confused, speech garbled.     Outcome: Ongoing, Progressing  Goal: Patient-Specific Goal (Individualized)  Outcome: Ongoing, Progressing  Goal: Absence of Hospital-Acquired Illness or Injury  Outcome: Ongoing, Progressing  Intervention: Identify and Manage Fall Risk  Recent Flowsheet Documentation  Taken 6/11/2021 0423 by Echo Cai RN  Safety Promotion/Fall Prevention: safety round/check completed  Taken 6/11/2021 0200 by Echo Cai RN  Safety Promotion/Fall Prevention: safety round/check completed  Taken 6/11/2021 0000 by Echo Cai RN  Safety Promotion/Fall Prevention: safety round/check completed  Taken 6/10/2021 2200 by Echo Cai RN  Safety Promotion/Fall Prevention: safety round/check completed  Taken 6/10/2021 2000 by Echo Cai RN  Safety Promotion/Fall Prevention: safety round/check completed  Taken 6/10/2021 1900 by Echo Cai RN  Safety Promotion/Fall Prevention: safety round/check  completed  Goal: Optimal Comfort and Wellbeing  Outcome: Ongoing, Progressing  Goal: Readiness for Transition of Care  Outcome: Ongoing, Progressing   Goal Outcome Evaluation:

## 2021-06-11 NOTE — PROGRESS NOTES
[facility]   Hospitalist Progress Note  Date: 2021  Patient Name: Deepti Ramirez  : 1955  MRN: [id number]  Date of admission: 6/10/2021      Subjective   Subjective     Admitted: 6/10/2021  Chief complaint: Slurred speech, altered mental status, restlessness  Consultants: Dr. 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, she also has a history of seizures post meningitis, she is on Keppra for, neurology is consulted, repeat LP pending, her expressive aphasia has improved, there is also the possibility of her having complicated UTI causing encephalopathy, this is based on urinalysis, request for urine culture and entered, placed on ceftriaxone    Interval follow-up: Patient seen and examined this morning, no acute distress, no acute overnight events, patient's mentation has improved significantly, she is alert and awake, oriented, speaking full sentences but still lacks certain degree of insight, could not recite the alphabets, missed several letters, did not answer questions appropriately, has trouble with interpretation of questioning, she does not have significant expressive aphasia she displayed on admission, she denied fevers, chills, sweats, dizziness, headache, chest pain, palpitations.  Telemetry reviewed, over the past 24 hours should be in sinus rhythm, in the 70s.  Neurology consulted, lumbar puncture ordered for today.  Keppra level still pending at the time of this dictation.    Review of Systems  Limited history obtained from the patient at the bedside due to her inability to answer questions or understand  questions  General ROS: Positive for Fatigue, Negative for - chills, fever  Psychological ROS: negative for - anxiety, depression, hallucinations  Ophthalmic ROS: negative for - blurry vision  ENT ROS: negative for - headaches, nasal congestion  Endocrine ROS: negative for - malaise/lethargy  Respiratory ROS: negative for - cough, orthopnea, shortness of breath  Cardiovascular ROS: negative for - chest pain, dyspnea on exertion  Gastrointestinal ROS: negative for - abdominal pain, constipation, diarrhea, heartburn  Genito-Urinary ROS: negative for - change in urinary stream  Musculoskeletal ROS: negative for - muscular weakness  Neurological ROS: Intermittent confusion negative for - confusion, numbness/tingling, or visual changes    Objective   Objective     Vitals:   Temp:  [97.4 °F (36.3 °C)-98.4 °F (36.9 °C)] 98.4 °F (36.9 °C)  Heart Rate:  [64-78] 77  Resp:  [16-20] 20  BP: (115-153)/(54-79) 126/71  Flow (L/min):  [2] 2  Physical Exam    Constitutional: Awake, alert, no acute distress   Eyes: Pupils equal, sclerae anicteric, no conjunctival injection   HENT: NCAT, mucous membranes moist   Neck: Supple, no thyromegaly, no lymphadenopathy, trachea midline   Respiratory: Clear to auscultation bilaterally, nonlabored respirations    Cardiovascular: RRR, no murmurs, rubs, or gallops, palpable pedal pulses bilaterally   Gastrointestinal: Positive bowel sounds, soft, nontender, nondistended   Musculoskeletal: No bilateral ankle edema, no clubbing or cyanosis to extremities   Psychiatric: Appropriate affect, cooperative   Neurologic: Oriented x 3, limited insight, difficulty understanding, strength symmetric in all extremities, Cranial Nerves grossly intact to confrontation, speech clear   Skin: No rashes     Result Review    Result Review:  I have personally reviewed the results from the time of this admission to 6/11/2021 11:33 EDT and agree with these findings:  [x]  Laboratory  [x]  Microbiology  [x]  Radiology  []   EKG/Telemetry   []  Cardiology/Vascular   []  Pathology  [x]  Old records  [x]  Other: Consultation notes provided by Dr. Whelan  Assessment/Plan   Assessment / Plan     Assessment/Plan:  Assessment:  Expressive aphasia  Encephalopathy; metabolic; due to UTI  Complicated UTI in female gram-positive versus gram-negative  Recent history of lymphocytic meningitis  Seizure disorder  COPD not in exacerbation  Current smoker  PTSD  Hypothyroidism  Chronic anemia  Anxiety  Depression  Dyslipidemia     Plan:  Labs and imaging reviewed  Change admission status to inpatient LOS exceeding 2 midnights, workup ongoing for change in mental status/expressive aphasia  F/u LP results  c/w neuro monitoring  Echo pending, images obtained  EEG pending  DC stroke pathway  PT/OT  1 g ceftriaxone daily continued  Follow urine culture; order placed to have it sent off  Aspirin, statin continued  Follow-up a.m. labs for fasting lipids  Telemetry monitoring continued  Tobacco cessation; nicotine patch  Follow-up Keppra level  Keppra reduced to 500 mg BID until level is reviewed  Neurology consulted discussed with Dr. Whelan, recs appreciated  A.m. labs  Full code  VTE prophylaxis SCDs  Clinical course to dictate further management  Discussed with nurse at the bedside      DVT prophylaxis:  Mechanical DVT prophylaxis orders are present.    CODE STATUS:   Level Of Support Discussed With: Patient  Code Status: CPR  Medical Interventions (Level of Support Prior to Arrest): Full        Electronically signed by Rishabh Vela MD, 06/11/21, 11:33 AM EDT.

## 2021-06-11 NOTE — PLAN OF CARE
Goal Outcome Evaluation:  Plan of Care Reviewed With: patient  Dysphagia evaluation completed at bedside.  Patient exhibits adequate swallow for tolerance of regular solid consistencies and thin liquids.  No further direct speech pathology services recommended for swallowing unless decline in status.

## 2021-06-11 NOTE — PLAN OF CARE
Goal Outcome Evaluation:  Plan of Care Reviewed With: patient           Outcome Summary: Patient able to complete all transfers with supervision.  She was able to ambulate 20 feet without an assistive device and supervision and also participated in marching and heel toe raises x20 repetitions without an assistive device and supervision.  Patient does not need inpatient PT services at this point in time.

## 2021-06-11 NOTE — THERAPY EVALUATION
Acute Care - Physical Therapy Initial Evaluation  ELLIOT Gibson     Patient Name: Deepti Ramirez  : 1955  MRN: [id number]  Today's Date: 2021           PT Assessment (last 12 hours)      PT Evaluation and Treatment     Row Name 21 1500          Physical Therapy Time and Intention    Subjective Information  no complaints  -DP     Document Type  evaluation  -DP     Mode of Treatment  individual therapy;physical therapy  -DP     Row Name 21 1500          General Information    Patient Profile Reviewed  yes  -DP     Patient Observations  alert;cooperative  -DP     Prior Level of Function  independent:;gait;transfer  -DP     Equipment Currently Used at Home  none  -DP     Existing Precautions/Restrictions  no known precautions/restrictions  -DP     Row Name 21 1500          Living Environment    Current Living Arrangements  home/apartment/condo  -DP     Home Accessibility  -- no GEORGE  -DP     Lives With  spouse  -DP     Row Name 21 1500          Home Use of Assistive/Adaptive Equipment    Equipment Currently Used at Home  none  -DP     Row Name 21 1500          Range of Motion (ROM)    Range of Motion  ROM is WFL;bilateral lower extremities  -DP     Row Name 21 1500          Strength (Manual Muscle Testing)    Strength (Manual Muscle Testing)  bilateral lower extremities 4+/5  -DP     Row Name 21 1500          Bed Mobility    Bed Mobility  bed mobility (all) activities  -DP     All Activities, Roslyn (Bed Mobility)  supervision  -DP     Row Name 21 1500          Transfers    Transfers  sit-stand transfer  -DP     Sit-Stand [place] (Transfers)  supervision  -DP     Row Name 21 1500          Gait/Stairs (Locomotion)    [place] Level (Gait)  supervision  -DP     Assistive Device (Gait)  -- none  -DP     Distance in Feet (Gait)  20 purewick in wall- hence ambulation distance limited  -DP     Comment (Gait/Stairs)  Participated in marching and  heel toe raises x 20 reps x no AD x supervision  -DP     Row Name 06/11/21 1500          Balance    Balance Assessment  standing dynamic balance  -DP     Dynamic Standing Balance  WNL  -DP     Row Name 06/11/21 1500          Plan of Care Review    Plan of Care Reviewed With  patient  -DP     Outcome Summary  Patient able to complete all transfers with supervision.  She was able to ambulate 20 feet without an assistive device and supervision and also participated in marching and heel toe raises x20 repetitions without an assistive device and supervision.  Patient does not need inpatient PT services at this point in time.  -DP     Row Name 06/11/21 1500          PT Evaluation Complexity    History, PT Evaluation Complexity  no personal factors and/or comorbidities  -DP     Examination of Body Systems (PT Eval Complexity)  total of 4 or more elements  -DP     Clinical Presentation (PT Evaluation Complexity)  stable  -DP     Clinical Decision Making (PT Evaluation Complexity)  low complexity  -DP     Overall Complexity (PT Evaluation Complexity)  low complexity  -DP       User Key  (r) = Recorded By, (t) = Taken By, (c) = Cosigned By    Initials Name Provider Type    Jaime Estes PT Physical Therapist        Physical Therapy Education                 Title: PT OT SLP Therapies (Done)     Topic: Physical Therapy (Done)     Point: Mobility training (Done)     Learning Progress Summary           Patient Acceptance, E,TB, VU by DP at 6/11/2021 1514                   Point: Home exercise program (Done)     Learning Progress Summary           Patient Acceptance, E,TB, VU by DP at 6/11/2021 1514                   Point: Body mechanics (Done)     Learning Progress Summary           Patient Acceptance, E,TB, VU by DP at 6/11/2021 1514                   Point: Precautions (Done)     Learning Progress Summary           Patient Acceptance, E,TB, VU by DP at 6/11/2021 1514                               User Key     Initials  Effective Dates Name Provider Type Discipline    DP 06/03/21 -  Jaime Snowden, PT Physical Therapist PT              PT Recommendation and Plan  Anticipated Discharge Disposition (PT): home, home with assist  Therapy Frequency (PT): evaluation only  Plan of Care Reviewed With: patient  Outcome Summary: Patient able to complete all transfers with supervision.  She was able to ambulate 20 feet without an assistive device and supervision and also participated in marching and heel toe raises x20 repetitions without an assistive device and supervision.  Patient does not need inpatient PT services at this point in time.  Outcome Measures     Row Name 06/11/21 1500             How much help from another person do you currently need...    Turning from your back to your side while in flat bed without using bedrails?  4  -DP      Moving from lying on back to sitting on the side of a flat bed without bedrails?  4  -DP      Moving to and from a bed to a chair (including a wheelchair)?  4  -DP      Standing up from a chair using your arms (e.g., wheelchair, bedside chair)?  4  -DP      Climbing 3-5 steps with a railing?  3  -DP      To walk in hospital room?  4  -DP      AM-PAC 6 Clicks Score (PT)  23  -DP         Functional Assessment    Outcome Measure Options  AM-PAC 6 Clicks Basic Mobility (PT)  -DP        User Key  (r) = Recorded By, (t) = Taken By, (c) = Cosigned By    Initials Name Provider Type    DP Jaime Snowden, PT Physical Therapist           Time Calculation:   PT Charges     Row Name 06/11/21 1515 06/11/21 1238          Time Calculation    PT Received On  06/11/21  -DP  06/11/21  -WILBUR        Untimed Charges    PT Eval/Re-eval Minutes  45  -DP  --        Total Minutes    Untimed Charges Total Minutes  45  -DP  --      Total Minutes  45  -DP  --       User Key  (r) = Recorded By, (t) = Taken By, (c) = Cosigned By    Initials Name Provider Type    Jaime Estes, PT Physical Therapist    Christine Burleson, PT  Physical Therapist        Therapy Charges for Today     Code Description Service Date Service Provider Modifiers Qty    22048512613 HC PT EVAL LOW COMPLEXITY 3 6/11/2021 Jaime Snowden, PT GP 1          PT G-Codes  Outcome Measure Options: AM-PAC 6 Clicks Basic Mobility (PT)  AM-PAC 6 Clicks Score (PT): 23    Jaime Snowden, NAM  6/11/2021

## 2021-06-11 NOTE — THERAPY EVALUATION
Acute Care - Speech Language Pathology   Swallow Initial Evaluation  Arthur     Patient Name: Deepti Ramirez  : 1955  MRN: [id number]  Today's Date: 2021               Admit Date: 6/10/2021    Visit Dx:     ICD-10-CM ICD-9-CM   1. Somnolence  R40.0 780.09   2. Cerebrovascular accident (CVA), unspecified mechanism (CMS/HCC)  I63.9 434.91   3. Acute cystitis without hematuria  N30.00 595.0   4. Dysphagia, unspecified type  R13.10 787.20     Patient Active Problem List   Diagnosis   • History of pulmonary embolus (PE)   • Bipolar disorder (CMS/HCC)   • Chronic obstructive pulmonary disease (CMS/HCC)   • Current smoker   • Osteoporosis   • Posttraumatic stress disorder   • Complicated UTI (urinary tract infection)   • Encephalopathy, metabolic   • Encephalopathy acute     Past Medical History:   Diagnosis Date   • Anemia    • Anxiety    • Arthritis    • Asthma    • Cancer (CMS/HCC)    • Chronic obstructive pulmonary disease (CMS/HCC)    • Depression    • Hyperlipidemia    • Meningitis 10/2020   • Seasonal allergies    • Sinus infection    • Thyroid disorder      Past Surgical History:   Procedure Laterality Date   • COLONOSCOPY  2019   • OTHER SURGICAL HISTORY      Metal implants    • TEETH EXTRACTION      2 teeth pulled    • VULVECTOMY          SWALLOW EVALUATION (last 72 hours)      SLP Adult Swallow Evaluation     Row Name 21 0730                   Rehab Evaluation    Symptoms Noted During/After Treatment  none  -SN           General Information    Prior Level of Function-Swallowing  no diet consistency restrictions;safe, efficient swallowing in all situations  -SN        Plans/Goals Discussed with  patient  -SN        Patient's Goals for Discharge  return to PO diet;return home  -SN           Oral Motor Structure and Function    Dentition Assessment  edentulous  -SN        Secretion Management  WNL/WFL  -SN        Mucosal Quality  moist, healthy  -SN        Gag Response  WFL  -SN         Volitional Swallow  WFL  -SN        Volitional Cough  WFL  -SN           Oral Musculature and Cranial Nerve Assessment    Oral Motor General Assessment  WFL  -SN           General Eating/Swallowing Observations    Respiratory Support Currently in Use  room air  -SN        Eating/Swallowing Skills  self-fed  -SN        Positioning During Eating  upright 90 degree  -SN        Utensils Used  spoon;cup;straw  -SN        Consistencies Trialed  regular textures;soft textures;pureed;thin liquids;nectar/syrup-thick liquids  -SN           Respiratory    Respiratory Status  room air;WFL  -SN           Clinical Swallow Eval    Oral Prep Phase  WFL  -SN        Oral Residue  WFL  -SN        Pharyngeal Phase  no overt signs/symptoms of pharyngeal impairment  -SN        Esophageal Phase  unremarkable  -SN           Recommendations    Therapy Frequency (Swallow)  evaluation only  -SN        SLP Diet Recommendation  regular textures;thin liquids  -SN        Recommended Precautions and Strategies  upright posture during/after eating  -SN        Anticipated Discharge Disposition (SLP)  home  -SN          User Key  (r) = Recorded By, (t) = Taken By, (c) = Cosigned By    Initials Name Effective Dates    Antoinette Gaitan SLP 03/31/21 -                     DYSPHAGIA CRITERIA: N/A    FUNCTIONAL ASSESSMENT INSTRUMENT: Patient currently scored a level 7 of 7 on Functional Communication Measures for swallowing indicating a 0% limitation in function.    ASSESSMENT/ PLAN OF CARE: At bedside, swallow function appears adequate for tolerance of regular solids and thin liquids.  No further direct speech pathology services recommended unless decline in status.    Pt/responsible party agrees with plan of care and has been informed of all alternatives, risks and benefits.    SLP Recommendation and Plan     SLP Diet Recommendation: regular textures, thin liquids  Recommended Precautions and Strategies: upright posture during/after eating          EDUCATION  The patient has been educated in the following areas:   Dysphagia (Swallowing Impairment).                Anticipated Discharge Disposition (SLP): home     Therapy Frequency (Swallow): evaluation only                            Plan of Care Reviewed With: patient           Time Calculation:   Time Calculation- SLP     Row Name 06/11/21 1048             Time Calculation- SLP    SLP Start Time  0730  -SN      SLP Received On  06/11/21  -SN         Untimed Charges    SLP Eval/Re-eval   ST Eval Oral Pharyng Swallow - 84554  -SN      05214-[id number] Eval Oral Pharyng Swallow Minutes  60  -SN         Total Minutes    Untimed Charges Total Minutes  60  -SN       Total Minutes  60  -SN        User Key  (r) = Recorded By, (t) = Taken By, (c) = Cosigned By    Initials Name Provider Type    SN Antoinette Lal SLP Speech and Language Pathologist          Therapy Charges for Today     Code Description Service Date Service Provider Modifiers Qty    87343902686  ST EVAL ORAL PHARYNG SWALLOW 4 6/11/2021 Antoinette Lal SLP GN 1               JADEN Mott  6/11/2021

## 2021-06-12 LAB
ANION GAP SERPL CALCULATED.3IONS-SCNC: 10.7 MMOL/L (ref 5–15)
ARTERIAL PATENCY WRIST A: POSITIVE
BASE EXCESS BLDA CALC-SCNC: -3.2 MMOL/L (ref -2–2)
BASOPHILS # BLD AUTO: 0.05 10*3/MM3 (ref 0–0.2)
BASOPHILS NFR BLD AUTO: 0.6 % (ref 0–1.5)
BDY SITE: ABNORMAL
BUN SERPL-MCNC: 10 MG/DL (ref 8–23)
BUN/CREAT SERPL: 14.5 (ref 7–25)
C GATTII+NEOFOR DNA CSF QL NAA+NON-PROBE: NOT DETECTED
CA-I BLDA-SCNC: 1.13 MMOL/L (ref 1.13–1.32)
CALCIUM SPEC-SCNC: 8.7 MG/DL (ref 8.6–10.5)
CHLORIDE BLDA-SCNC: 105 MMOL/L (ref 98–106)
CHLORIDE SERPL-SCNC: 106 MMOL/L (ref 98–107)
CMV DNA CSF QL NAA+PROBE: NOT DETECTED
CO2 SERPL-SCNC: 23.3 MMOL/L (ref 22–29)
COHGB MFR BLD: 0.6 % (ref 0–1.5)
CREAT SERPL-MCNC: 0.69 MG/DL (ref 0.57–1)
DEPRECATED RDW RBC AUTO: 44.1 FL (ref 37–54)
E COLI K1 DNA CSF QL NAA+NON-PROBE: NOT DETECTED
EOSINOPHIL # BLD AUTO: 0.17 10*3/MM3 (ref 0–0.4)
EOSINOPHIL NFR BLD AUTO: 2 % (ref 0.3–6.2)
ERYTHROCYTE [DISTWIDTH] IN BLOOD BY AUTOMATED COUNT: 12.7 % (ref 12.3–15.4)
EV RNA CSF QL NAA+PROBE: NOT DETECTED
FHHB: 6.2 % (ref 0–5)
GAS FLOW AIRWAY: 2 LPM
GFR SERPL CREATININE-BSD FRML MDRD: 85 ML/MIN/1.73
GLUCOSE BLDA-MCNC: 131 MMOL/L (ref 65–99)
GLUCOSE SERPL-MCNC: 93 MG/DL (ref 65–99)
GP B STREP DNA SPEC QL NAA+PROBE: NOT DETECTED
HAEM INFLU SEROTYP DNA SPEC NAA+PROBE: NOT DETECTED
HCO3 BLDA-SCNC: 20.3 MMOL/L (ref 22–26)
HCT VFR BLD AUTO: 42.1 % (ref 34–46.6)
HGB BLD-MCNC: 14.4 G/DL (ref 12–15.9)
HGB BLDA-MCNC: 13.1 G/DL (ref 11.7–14.6)
HHV6 DNA CSF QL NAA+PROBE: NOT DETECTED
HSV1 DNA CSF QL NAA+PROBE: NOT DETECTED
HSV2 DNA CSF QL NAA+PROBE: NOT DETECTED
IMM GRANULOCYTES # BLD AUTO: 0.03 10*3/MM3 (ref 0–0.05)
IMM GRANULOCYTES NFR BLD AUTO: 0.3 % (ref 0–0.5)
INHALED O2 CONCENTRATION: 28 %
L MONOCYTOG RRNA SPEC QL PROBE: NOT DETECTED
LACTATE BLDA-SCNC: 1.19 MMOL/L (ref 0.5–2)
LYMPHOCYTES # BLD AUTO: 2.29 10*3/MM3 (ref 0.7–3.1)
LYMPHOCYTES NFR BLD AUTO: 26.5 % (ref 19.6–45.3)
MAGNESIUM SERPL-MCNC: 2.2 MG/DL (ref 1.6–2.4)
MCH RBC QN AUTO: 32.6 PG (ref 26.6–33)
MCHC RBC AUTO-ENTMCNC: 34.2 G/DL (ref 31.5–35.7)
MCV RBC AUTO: 95.2 FL (ref 79–97)
METHGB BLD QL: 0.2 % (ref 0–1.5)
MODALITY: ABNORMAL
MONOCYTES # BLD AUTO: 0.79 10*3/MM3 (ref 0.1–0.9)
MONOCYTES NFR BLD AUTO: 9.2 % (ref 5–12)
N MEN DNA SPEC QL NAA+PROBE: NOT DETECTED
NEUTROPHILS NFR BLD AUTO: 5.3 10*3/MM3 (ref 1.7–7)
NEUTROPHILS NFR BLD AUTO: 61.4 % (ref 42.7–76)
NRBC BLD AUTO-RTO: 0 /100 WBC (ref 0–0.2)
OXYHGB MFR BLDV: 93 % (ref 94–99)
PARECHOVIRUS A RNA CSF QL NAA+NON-PROBE: NOT DETECTED
PCO2 BLDA: 31.6 MM HG (ref 35–45)
PH BLDA: 7.42 PH UNITS (ref 7.35–7.45)
PHOSPHATE SERPL-MCNC: 2.3 MG/DL (ref 2.5–4.5)
PLATELET # BLD AUTO: 193 10*3/MM3 (ref 140–450)
PMV BLD AUTO: 10.6 FL (ref 6–12)
PO2 BLD: 264 MM[HG] (ref 0–500)
PO2 BLDA: 73.9 MM HG (ref 80–100)
POTASSIUM BLDA-SCNC: 4.57 MMOL/L (ref 3.5–5)
POTASSIUM SERPL-SCNC: 3.7 MMOL/L (ref 3.5–5.2)
RBC # BLD AUTO: 4.42 10*6/MM3 (ref 3.77–5.28)
S PNEUM DNA CSF QL NAA+NON-PROBE: NOT DETECTED
SAO2 % BLDCOA: 93.8 % (ref 95–99)
SODIUM BLDA-SCNC: 133.8 MMOL/L (ref 136–146)
SODIUM SERPL-SCNC: 140 MMOL/L (ref 136–145)
VZV DNA CSF QL NAA+PROBE: NOT DETECTED
WBC # BLD AUTO: 8.63 10*3/MM3 (ref 3.4–10.8)

## 2021-06-12 PROCEDURE — 97165 OT EVAL LOW COMPLEX 30 MIN: CPT

## 2021-06-12 PROCEDURE — 99226 PR SBSQ OBSERVATION CARE/DAY 35 MINUTES: CPT | Performed by: FAMILY MEDICINE

## 2021-06-12 PROCEDURE — 83735 ASSAY OF MAGNESIUM: CPT | Performed by: FAMILY MEDICINE

## 2021-06-12 PROCEDURE — G0378 HOSPITAL OBSERVATION PER HR: HCPCS

## 2021-06-12 PROCEDURE — 87483 CNS DNA AMP PROBE TYPE 12-25: CPT | Performed by: FAMILY MEDICINE

## 2021-06-12 PROCEDURE — 85025 COMPLETE CBC W/AUTO DIFF WBC: CPT | Performed by: FAMILY MEDICINE

## 2021-06-12 PROCEDURE — 80048 BASIC METABOLIC PNL TOTAL CA: CPT | Performed by: FAMILY MEDICINE

## 2021-06-12 PROCEDURE — 25010000002 CEFTRIAXONE PER 250 MG: Performed by: FAMILY MEDICINE

## 2021-06-12 PROCEDURE — 99214 OFFICE O/P EST MOD 30 MIN: CPT | Performed by: PSYCHIATRY & NEUROLOGY

## 2021-06-12 PROCEDURE — 84100 ASSAY OF PHOSPHORUS: CPT | Performed by: FAMILY MEDICINE

## 2021-06-12 RX ADMIN — CEFTRIAXONE 1 G: 10 INJECTION, POWDER, FOR SOLUTION INTRAVENOUS at 08:39

## 2021-06-12 RX ADMIN — LEVETIRACETAM 500 MG: 500 TABLET, FILM COATED ORAL at 08:38

## 2021-06-12 RX ADMIN — ATORVASTATIN CALCIUM 80 MG: 40 TABLET, FILM COATED ORAL at 20:09

## 2021-06-12 RX ADMIN — LEVETIRACETAM 500 MG: 500 TABLET, FILM COATED ORAL at 20:10

## 2021-06-12 RX ADMIN — ESCITALOPRAM 10 MG: 10 TABLET, FILM COATED ORAL at 08:38

## 2021-06-12 RX ADMIN — MULTIPLE VITAMINS W/ MINERALS TAB 1 TABLET: TAB at 08:38

## 2021-06-12 RX ADMIN — SODIUM CHLORIDE, POTASSIUM CHLORIDE, SODIUM LACTATE AND CALCIUM CHLORIDE 75 ML/HR: 600; 310; 30; 20 INJECTION, SOLUTION INTRAVENOUS at 19:03

## 2021-06-12 RX ADMIN — ASPIRIN 81 MG: 81 TABLET, COATED ORAL at 08:38

## 2021-06-12 RX ADMIN — LEVOTHYROXINE SODIUM 100 MCG: 100 TABLET ORAL at 05:37

## 2021-06-12 NOTE — THERAPY EVALUATION
Patient Name: Deepti Ramirez  : 1955    MRN: [id number]                              Today's Date: 2021       Admit Date: 6/10/2021    Visit Dx:     ICD-10-CM ICD-9-CM   1. Somnolence  R40.0 780.09   2. Cerebrovascular accident (CVA), unspecified mechanism (CMS/HCC)  I63.9 434.91   3. Acute cystitis without hematuria  N30.00 595.0   4. Dysphagia, unspecified type  R13.10 787.20   5. Difficulty walking  R26.2 719.7   6. Decreased activities of daily living (ADL)  Z78.9 V49.89     Patient Active Problem List   Diagnosis   • History of pulmonary embolus (PE)   • Bipolar disorder (CMS/[facility])   • Chronic obstructive pulmonary disease (CMS/[facility])   • Current smoker   • Osteoporosis   • Posttraumatic stress disorder   • Complicated UTI (urinary tract infection)   • Encephalopathy, metabolic   • Encephalopathy acute     Past Medical History:   Diagnosis Date   • Anemia    • Anxiety    • Arthritis    • Asthma    • Cancer (CMS/[facility])    • Chronic obstructive pulmonary disease (CMS/[facility])    • Depression    • Hyperlipidemia    • Meningitis 10/2020   • Seasonal allergies    • Sinus infection    • Thyroid disorder      Past Surgical History:   Procedure Laterality Date   • COLONOSCOPY  2019   • OTHER SURGICAL HISTORY      Metal implants    • TEETH EXTRACTION      2 teeth pulled    • VULVECTOMY       General Information     Row Name 21          OT Time and Intention    Document Type  evaluation  -AC     Mode of Treatment  individual therapy  -AC     Row Name 21          General Information    Patient Profile Reviewed  yes  -AC     Prior Level of Function  independent: no device. uses shower chair. not driving  -AC     Existing Precautions/Restrictions  no known precautions/restrictions  -AC     Barriers to Rehab  none identified  -AC     Row Name 21          Occupational Profile    Reason for Services/Referral (Occupational Profile)  Pt. is a 65 year old female admitted for the  above diagnosis. Pt. referred to OT services to assess independence with ADLs and adl transfers/fx'l mobility. No previous OT services for current condition.  -[facility] Name 06/12/21 0901          Living Environment    Lives With  spouse  -AC     Row Name 06/12/21 0901          Cognition    Orientation Status (Cognition)  oriented x 3  -AC     Row Name 06/12/21 0901          Safety Issues, Functional Mobility    Safety Issues Affecting Function (Mobility)  -- na  -     Impairments Affecting Function (Mobility)  -- na  -       User Key  (r) = Recorded By, (t) = Taken By, (c) = Cosigned By    Initials Name Provider Type    Ailyn Maldonado OT Occupational Therapist          Mobility/ADL's     Row [facility] 06/12/21 0903          Transfers    Transfers  sit-stand transfer  -     Sit-Stand [place] (Transfers)  independent  -AC     Row Name 06/12/21 0903          Sit-Stand Transfer    Assistive Device (Sit-Stand Transfers)  -- no device  -AC     Row Name 06/12/21 0903          Functional Mobility    Functional Mobility- Ind. Level  independent  -     Functional Mobility- Comment  from recliner approx 5 steps  -AC     Row Name 06/12/21 0903          Activities of Daily Living    BADL Assessment/Intervention  -- patient independent with upper body and lower body adls  -       User Key  (r) = Recorded By, (t) = Taken By, (c) = Cosigned By    Initials Name Provider Type    Ailyn Maldonado OT Occupational Therapist        Obj/Interventions     Row Name 06/12/21 0904          Sensory Assessment (Somatosensory)    Sensory Assessment (Somatosensory)  sensation intact  -AC     Row Name 06/12/21 0904          Vision Assessment/Intervention    Visual Impairment/Limitations  WNL  -AC     Row Name 06/12/21 0904          Range of Motion Comprehensive    General Range of Motion  bilateral upper extremity ROM WNL  -AC     Row Name 06/12/21 0904          Strength Comprehensive (MMT)    General Manual Muscle Testing (MMT)  Assessment  no strength deficits identified  -[facility] Name 06/12/21 0904          Motor Skills    Motor Skills  coordination;functional endurance  -AC     Coordination  WNL  -     Functional Endurance  good  -[facility] Name 06/12/21 0904          Balance    Balance Assessment  standing dynamic balance  -     Dynamic Standing Balance  WNL  -       User Key  (r) = Recorded By, (t) = Taken By, (c) = Cosigned By    Initials Name Provider Type    AC Ailyn Shin OT Occupational Therapist        Goals/Plan    No documentation.       Clinical Impression     Row Name 06/12/21 0905          Pain Assessment    Additional Documentation  Pain Scale: FACES Pre/Post-Treatment (Group)  -AC     Row Name 06/12/21 0905          Pain Scale: FACES Pre/Post-Treatment    Pain: FACES Scale, Pretreatment  0-->no hurt  -     Posttreatment Pain Rating  0-->no hurt  -AC     Row Name 06/12/21 0905          Plan of Care Review    Plan of Care Reviewed With  patient  -     Progress  no change  -     Outcome Summary  Patient not appropriate for skilled OT services at this time as patient has not had a decline in ADLs or ADL transfers/fx'l mobility. patient safe to d/c to previous setting when medically appropriate. d/c occupational therapy services.  -     Row Name 06/12/21 0905          Therapy Assessment/Plan (OT)    Patient/Family Therapy Goal Statement (OT)  na  -     Rehab Potential (OT)  -- na  -     Criteria for Skilled Therapeutic Interventions Met (OT)  no problems identified which require skilled intervention  -     Therapy Frequency (OT)  evaluation only  -     Row Name 06/12/21 0905          Therapy Plan Review/Discharge Plan (OT)    Equipment Needs Upon Discharge (OT)  -- na  -AC     Anticipated Discharge Disposition (OT)  home  -       User Key  (r) = Recorded By, (t) = Taken By, (c) = Cosigned By    Initials Name Provider Type    Ailyn Maldonado OT Occupational Therapist        Outcome Measures      Row Name 06/12/21 0906          How much help from another is currently needed...    Putting on and taking off regular lower body clothing?  4  -AC     Bathing (including washing, rinsing, and drying)  4  -AC     Toileting (which includes using toilet bed pan or urinal)  4  -AC     Putting on and taking off regular upper body clothing  4  -AC     Taking care of personal grooming (such as brushing teeth)  4  -AC     Eating meals  4  -AC     AM-PAC 6 Clicks Score (OT)  24  -AC     Row Name 06/12/21 0712          How much help from another person do you currently need...    Turning from your back to your side while in flat bed without using bedrails?  4  -LG     Moving from lying on back to sitting on the side of a flat bed without bedrails?  4  -LG     Moving to and from a bed to a chair (including a wheelchair)?  3  -LG     Standing up from a chair using your arms (e.g., wheelchair, bedside chair)?  4  -LG     Climbing 3-5 steps with a railing?  3  -LG     To walk in hospital room?  4  -LG     AM-PAC 6 Clicks Score (PT)  22  -LG     Row Name 06/12/21 0906          Functional Assessment    Outcome Measure Options  AM-PAC 6 Clicks Daily Activity (OT);Optimal Instrument  -AC     Row Name 06/12/21 0906          Optimal Instrument    Optimal Instrument  Optimal - 3  -AC     Bending/Stooping  1  -AC     Standing  1  -AC     Reaching  1  -AC     From the list, choose the 3 activities you would most like to be able to do without any difficulty  Bending/stooping;Standing;Reaching  -AC     Total Score Optimal - 3  3  -AC       User Key  (r) = Recorded By, (t) = Taken By, (c) = Cosigned By    Initials Name Provider Type    Ailyn Maldonado OT Occupational Therapist    LG Nanda Solis RNA Registered Nurse        Occupational Therapy Education                 Title: PT OT SLP Therapies (Done)     Topic: Occupational Therapy (Done)     Point: ADL training (Done)     Description:   Instruct learner(s) on proper safety  adaptation and remediation techniques during self care or transfers.   Instruct in proper use of assistive devices.              Learning Progress Summary           Patient Acceptance, E, VU by  at 6/12/2021 0906                   Point: Home exercise program (Done)     Description:   Instruct learner(s) on appropriate technique for monitoring, assisting and/or progressing therapeutic exercises/activities.              Learning Progress Summary           Patient Acceptance, E, VU by  at 6/12/2021 0906                   Point: Precautions (Done)     Description:   Instruct learner(s) on prescribed precautions during self-care and functional transfers.              Learning Progress Summary           Patient Acceptance, E, VU by  at 6/12/2021 0906                   Point: Body mechanics (Done)     Description:   Instruct learner(s) on proper positioning and spine alignment during self-care, functional mobility activities and/or exercises.              Learning Progress Summary           Patient Acceptance, E, VU by  at 6/12/2021 0906                               User Key     Initials Effective Dates Name Provider Type Discipline     03/31/21 -  Ailyn Shin OT Occupational Therapist OT              OT Recommendation and Plan  Therapy Frequency (OT): evaluation only  Plan of Care Review  Plan of Care Reviewed With: patient  Progress: no change  Outcome Summary: Patient not appropriate for skilled OT services at this time as patient has not had a decline in ADLs or ADL transfers/fx'l mobility. patient safe to d/c to previous setting when medically appropriate. d/c occupational therapy services.     Time Calculation:   Time Calculation- OT     Row Name 06/12/21 0908             Time Calculation- OT    OT Received On  06/12/21  -AC         Untimed Charges    OT Eval/Re-eval Minutes  20  -AC         Total Minutes    Untimed Charges Total Minutes  20  -AC       Total Minutes  20  -AC        User Key  (r) =  Recorded By, (t) = Taken By, (c) = Cosigned By    Initials Name Provider Type     Ailyn Shin OT Occupational Therapist        Therapy Charges for Today     Code Description Service Date Service Provider Modifiers Qty    93568655123 HC OT EVAL LOW COMPLEXITY 2 6/12/2021 Ailyn Shin OT GO 1               Ailyn Shin OT  6/12/2021

## 2021-06-12 NOTE — PROGRESS NOTES
[facility]   Hospitalist Progress Note  Date: 2021  Patient Name: Deepti Ramirez  : 1955  MRN: [id number]  Date of admission: 6/10/2021      Subjective   Subjective     Admitted: 6/10/2021  Chief complaint: Slurred speech, altered mental status, restlessness  Consultants: Dr. 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, she also has a history of seizures post meningitis, she is on Keppra for, neurology is consulted, LP performed, cultures pending, low suspicion of meningitis, altered mental status and change in mentation after episode of staring possibly seizure, Keppra dose increased, her expressive aphasia has improved, there is also the possibility of her having complicated UTI causing encephalopathy, this is based on urinalysis, request for urine culture and entered, placed on ceftriaxone    Interval follow-up: Patient seen and examined, no acute distress, no acute overnight events, patient alert and awake today, no acute distress, no focal neurological changes, she still has difficulty reciting the alphabet, she misses letters T through Y, still has difficulty understanding questions   Update telemetry reviewed, sinus rhythm in 70s, alarm review over the past 24 hours, no safe strips.      Review of Systems  Limited history obtained from the patient at the bedside due to her inability to answer questions or understand questions  General ROS: Positive for Fatigue, Negative for - chills, fever  Psychological ROS: negative for - anxiety, depression, hallucinations  Ophthalmic ROS: negative for - blurry vision  ENT ROS:  negative for - headaches, nasal congestion  Endocrine ROS: negative for - malaise/lethargy  Respiratory ROS: negative for - cough, orthopnea, shortness of breath  Cardiovascular ROS: negative for - chest pain, dyspnea on exertion  Gastrointestinal ROS: negative for - abdominal pain, constipation, diarrhea, heartburn  Genito-Urinary ROS: negative for - change in urinary stream  Musculoskeletal ROS: negative for - muscular weakness  Neurological ROS: Intermittent confusion negative for - confusion, numbness/tingling, or visual changes    Objective   Objective     Vitals:   Temp:  [98.2 °F (36.8 °C)-98.7 °F (37.1 °C)] 98.2 °F (36.8 °C)  Heart Rate:  [77-84] 84  Resp:  [16-18] 18  BP: (120-153)/(69-91) 137/73  Physical Exam    Constitutional: Awake, alert, no acute distress   Eyes: Pupils equal, sclerae anicteric, no conjunctival injection   HENT: NCAT, mucous membranes moist   Neck: Supple, no thyromegaly, no lymphadenopathy, trachea midline   Respiratory: Clear to auscultation bilaterally, nonlabored respirations    Cardiovascular: RRR, no murmurs, rubs, or gallops, palpable pedal pulses bilaterally   Gastrointestinal: Positive bowel sounds, soft, nontender, nondistended   Musculoskeletal: No bilateral ankle edema, no clubbing or cyanosis to extremities   Psychiatric: Appropriate affect, cooperative   Neurologic: Oriented x 3, limited insight, difficulty understanding, strength symmetric in all extremities, Cranial Nerves grossly intact to confrontation, speech clear   Skin: No rashes     Result Review    Result Review:  I have personally reviewed the results from the time of this admission to 6/12/2021 13:17 EDT and agree with these findings:  [x]  Laboratory  [x]  Microbiology  [x]  Radiology  []  EKG/Telemetry   []  Cardiology/Vascular   []  Pathology  [x]  Old records  [x]  Other: Consultation notes provided by Dr. Whelan  Assessment/Plan   Assessment / Plan     Assessment/Plan:  Assessment:  Expressive  aphasia  Encephalopathy; metabolic; due to UTI  Complicated UTI in female gram-positive versus gram-negative  Recent history of lymphocytic meningitis  Seizure disorder  COPD not in exacerbation  Current smoker  PTSD  Hypothyroidism  Chronic anemia  Anxiety  Depression  Dyslipidemia     Plan:  Labs and imaging reviewed  LP results reviewed, low suspicion of meningitis  Discussed with Dr. Powell, neurology, possible seizures  Increase Keppra dose to 1000 mg at night c/w 500 mg daily in AM  Continue monitoring neurologically  Echo reviewed  PT/OT continued  1 g ceftriaxone daily continued   Aspirin, statin continued  Follow-up a.m. labs for fasting lipids  Telemetry monitoring continued  Tobacco cessation; nicotine patch  Follow-up Keppra level remains pending x 2  A.m. labs  Full code  VTE prophylaxis SCDs  Clinical course to dictate further management  Discussed with nurse at the bedside      DVT prophylaxis:  Mechanical DVT prophylaxis orders are present.    CODE STATUS:   Level Of Support Discussed With: Patient  Code Status: CPR  Medical Interventions (Level of Support Prior to Arrest): Full        Electronically signed by Rishabh Vela MD, 06/12/21, 1:17 PM EDT.              Postoperative state

## 2021-06-12 NOTE — PLAN OF CARE
Goal Outcome Evaluation:  Plan of Care Reviewed With: patient        Progress: no change  Outcome Summary: Patient not appropriate for skilled OT services at this time as patient has not had a decline in ADLs or ADL transfers/fx'l mobility. patient safe to d/c to previous setting when medically appropriate. d/c occupational therapy services.

## 2021-06-12 NOTE — CONSULTS
[facility]   Consult Note      Patient Name: Deepti Ramirez  : 1955  MRN: [id number]  Primary Care Physician:  Sweetie Duque APRN  Date of admission: 6/10/2021    Subjective   Subjective     Gait 65 years old woman was seen upon request of Dr. Vela for evaluation because of alteration in her mental state.    Chief Complaint: Confusion and alteration in her mental state.    HPI:     The information was obtained from the  who dated the onset of her illness sometime on Thursday, 2021 who had he has been noted that she was not making any sense.  She was confused.  She was taken to the emergency room and has had a CAT scan of the brain which showed no acute intracranial abnormality.  There are patchy areas of low-attenuation within the periventricular white matter bilaterally compatible with chronic small vessel ischemic disease.    She was then admitted to the progressive care unit and had an MRI of the brain which showed no acute brain abnormality.  There may be moderate to severe chronic small vessel ischemic changes or infarction.    She also had a CT angiogram of the head which showed no intracranial vascular stenosis or occlusion.  No pathologic intracranial contrast enhancement.  She also had a carotid Doppler study which were unremarkable.    She was found to have a urinary tract infection and was started on antibiotic in the form of Rocephin 1 g daily intravenously.  Slowly gradually she improved improved.  By 2021, the  mentioned that she is back to 100% to her usual self.  She has not had any more staring spells.    Review of Systems   There is no difficulty seeing especially speaking any swelling.  No difficulty in breathing.  There is no chest pains or abdominal pains.  She is not incontinent of her urine.    Personal History     Past Medical History:   Diagnosis Date   • Anemia    • Anxiety    • Arthritis    • Asthma    • Cancer (CMS/HCC)    • Chronic  obstructive pulmonary disease (CMS/HCC)    • Depression    • Hyperlipidemia    • Meningitis 10/2020   • Seasonal allergies    • Sinus infection    • Thyroid disorder        Past Surgical History:   Procedure Laterality Date   • COLONOSCOPY  04/22/2019   • OTHER SURGICAL HISTORY      Metal implants    • TEETH EXTRACTION      2 teeth pulled    • VULVECTOMY  2004       Family History: family history includes Arthritis in her mother; Diabetes in her father, mother, and sister; Heart disease in her father; Hypertension in her father and mother. Otherwise pertinent FHx was reviewed and not pertinent to current issue.    Social History:  reports that she has been smoking. She has a 30.00 pack-year smoking history. She does not have any smokeless tobacco history on file. She reports that she does not drink alcohol.      Home Medications:  Cyanocobalamin, aspirin, escitalopram, levETIRAcetam, levothyroxine, multivitamin with minerals, potassium chloride, prazosin, and vitamin D      Allergies:  Allergies   Allergen Reactions   • Sulfa Antibiotics Unknown - Low Severity       Objective   Objective     Vitals:   Temp:  [98.2 °F (36.8 °C)-98.7 °F (37.1 °C)] 98.2 °F (36.8 °C)  Heart Rate:  [77-84] 84  Resp:  [16-18] 18  BP: (120-153)/(69-91) 137/73  Physical Exam    Constitutional: Awake, alert, she was not a form distress.  She was well developed and fairly nourished.  She was eating her lunch when I walked into her room.  She had no difficulty swallowing.     Her heart was regular.  Heart rate was 80/min.  There were no murmurs her lungs were clear.  The carotid pulses were 1+ and equal.  There were no bruit on either side.    Neurologic:   Her responses were coherent and relevant.  She was aware of what was going on around her.  She has an insight to her condition.  She was able to understand and follow verbal command.      I did not look into the fundus on either side because of the COVID-19 pandemic social distancing.       The pupils were 3 to 4 mm there were round and equal and reactive to light directly and consensually.  There were no extraocular muscle weakness.      There was no facial asymmetry.  No facial weakness.      Strength of the sternomastoid and trapezius muscles were normal and symmetrical.  Her uvula and her tongue were in the midline.      The strength in both upper and lower extremities were normal and equal.  She felt pinprick equal in both sides of the forehead face lower jaw as well as both upper and lower extremities and both sides of her tongue.      The deep tendon reflexes were hypoactive to absent on both sides.  She did finger-to-nose test fairly well equal on both sides.      Result Review    Result Review:  I have personally reviewed the results from the time of this admission to 6/12/2021 13:25 EDT and agree with these findings:  [x]  Laboratory  []  Microbiology  [x]  Radiology  []  EKG/Telemetry   []  Cardiology/Vascular   [x]  Pathology  [x]  Old records  []  Other:  Most notable findings include  Carotid Doppler 6/10/2021  · All other left side carotid system vessels are normal.  · All other right side carotid system vessels are normal.    Brain MRI 6/10/2021  1.          No acute brain abnormality is seen. No acute infarct. No acute intracranial hemorrhage.   2.          There may be moderate to severe chronic small vessel ischemia/infarction.   3.          Slight motion artifact obscures detail on some sequences.    CT Scan Brain 6/10/2021  1. No acute intracranial abnormality  2. Patchy areas of low attenuation within the periventricular white matter bilaterally compatible   with chronic small vessel ischemic disease.    EEG 6/11/2021:  This EEG is abnormal secondary to slowing of background activity.  This finding is suggestive of a mild to moderate encephalopathy of nonspecific etiology.  No epileptiform discharges are recorded.      Assessment/Plan   Assessment / Plan     Impression:     Partial Complex Seizures  Altered Mental State  Urinary Tract Infection        Active Hospital Problems:  Active Hospital Problems    Diagnosis    • Encephalopathy, metabolic    • Encephalopathy acute    • Complicated UTI (urinary tract infection)      Plan:   We will increase the Keppra to 500 mg in the morning and 1000 mg at night.  This was explained to her and her .  They are agreeable with the plan.    Meanwhile, continue the rest of the medicine that she is taking.    Thank you very much for letting us see this patient.  I will see her again in 1 month after discharge.    DVT prophylaxis:  Mechanical DVT prophylaxis orders are present.    CODE STATUS:    Level Of Support Discussed With: Patient  Code Status: CPR  Medical Interventions (Level of Support Prior to Arrest): Full        Electronically signed by Jigar Powell Jr., MD, 06/12/21, 1:25 PM EDT.

## 2021-06-12 NOTE — PROGRESS NOTES
[facility]   Neurology Progress Note    Patient Name: Deepti Ramirez  : 1955  MRN: [id number]  Primary Care Physician:  Sweetie Duque APRN  Referring Physician: No ref. provider found  Date of admission: 6/10/2021    Subjective   Subjective     Reason for Consult/ Chief Complaint:     HPI:  Deepti Ramirez is a 65 y.o. female         Objective     Vitals:   Temp:  [97.4 °F (36.3 °C)-98.6 °F (37 °C)] 98.2 °F (36.8 °C)  Heart Rate:  [69-83] 79  Resp:  [16-20] 18  BP: (115-153)/(54-93) 145/69  Flow (L/min):  [2] 2    Physical Exam:      Result Review    Result Review:  I have personally reviewed the results from the time of this admission to 2021 00:06 EDT and agree with these findings:  []  Laboratory  []  Microbiology  []  Radiology  []  EKG/Telemetry   []  Cardiology/Vascular   []  Pathology  []  Old records  []  Other:      Assessment/Plan   Assessment / Plan   Active Hospital Problems:  Active Hospital Problems    Diagnosis    • Encephalopathy, metabolic    • Encephalopathy acute    • Complicated UTI (urinary tract infection)          Plan: CSF findings noted.  Awaiting rest of laboratory work-up to look for autoimmune encephalitis.  Will consult Dr. Powell since he is following this patient on an outpatient basis.    I reviewed the CSF findings again this morning and tried to call the laboratory to clarify the results however I was not able to get through several attempts at 9.  I believe that the WBC count is 5 and therefore not significant.  The CSF protein however significantly elevated at 108 which is abnormal.  We will have Dr. Powell follow-up the patient.    Total time spent with the patient and coordinating patient care was minutes.    electronically signed by Fabiano Whelan MD, 21, 12:06 AM EDT.

## 2021-06-12 NOTE — PLAN OF CARE
Goal Outcome Evaluation:           Progress: improving  Outcome Summary: Patient alert and oriented. No confusion noted during shift.  Nanda Solis, RNA

## 2021-06-13 VITALS
WEIGHT: 190.92 LBS | TEMPERATURE: 98.9 F | HEIGHT: 60 IN | HEART RATE: 83 BPM | OXYGEN SATURATION: 100 % | RESPIRATION RATE: 18 BRPM | SYSTOLIC BLOOD PRESSURE: 141 MMHG | DIASTOLIC BLOOD PRESSURE: 83 MMHG | BODY MASS INDEX: 37.48 KG/M2

## 2021-06-13 LAB
ANION GAP SERPL CALCULATED.3IONS-SCNC: 11.8 MMOL/L (ref 5–15)
BASOPHILS # BLD AUTO: 0.03 10*3/MM3 (ref 0–0.2)
BASOPHILS NFR BLD AUTO: 0.3 % (ref 0–1.5)
BUN SERPL-MCNC: 12 MG/DL (ref 8–23)
BUN/CREAT SERPL: 15 (ref 7–25)
CALCIUM SPEC-SCNC: 9.2 MG/DL (ref 8.6–10.5)
CHLORIDE SERPL-SCNC: 102 MMOL/L (ref 98–107)
CO2 SERPL-SCNC: 22.2 MMOL/L (ref 22–29)
CREAT SERPL-MCNC: 0.8 MG/DL (ref 0.57–1)
DEPRECATED RDW RBC AUTO: 43.4 FL (ref 37–54)
EOSINOPHIL # BLD AUTO: 0.22 10*3/MM3 (ref 0–0.4)
EOSINOPHIL NFR BLD AUTO: 2.5 % (ref 0.3–6.2)
ERYTHROCYTE [DISTWIDTH] IN BLOOD BY AUTOMATED COUNT: 12.7 % (ref 12.3–15.4)
GFR SERPL CREATININE-BSD FRML MDRD: 72 ML/MIN/1.73
GLUCOSE SERPL-MCNC: 128 MG/DL (ref 65–99)
HCT VFR BLD AUTO: 46.3 % (ref 34–46.6)
HGB BLD-MCNC: 16.1 G/DL (ref 12–15.9)
IMM GRANULOCYTES # BLD AUTO: 0.04 10*3/MM3 (ref 0–0.05)
IMM GRANULOCYTES NFR BLD AUTO: 0.4 % (ref 0–0.5)
LYMPHOCYTES # BLD AUTO: 1.72 10*3/MM3 (ref 0.7–3.1)
LYMPHOCYTES NFR BLD AUTO: 19.3 % (ref 19.6–45.3)
MAGNESIUM SERPL-MCNC: 2.1 MG/DL (ref 1.6–2.4)
MCH RBC QN AUTO: 32.7 PG (ref 26.6–33)
MCHC RBC AUTO-ENTMCNC: 34.8 G/DL (ref 31.5–35.7)
MCV RBC AUTO: 94.1 FL (ref 79–97)
MONOCYTES # BLD AUTO: 0.69 10*3/MM3 (ref 0.1–0.9)
MONOCYTES NFR BLD AUTO: 7.7 % (ref 5–12)
NEUTROPHILS NFR BLD AUTO: 6.22 10*3/MM3 (ref 1.7–7)
NEUTROPHILS NFR BLD AUTO: 69.8 % (ref 42.7–76)
NRBC BLD AUTO-RTO: 0 /100 WBC (ref 0–0.2)
PHOSPHATE SERPL-MCNC: 3.3 MG/DL (ref 2.5–4.5)
PLATELET # BLD AUTO: 207 10*3/MM3 (ref 140–450)
PMV BLD AUTO: 10.2 FL (ref 6–12)
POTASSIUM SERPL-SCNC: 4.3 MMOL/L (ref 3.5–5.2)
RBC # BLD AUTO: 4.92 10*6/MM3 (ref 3.77–5.28)
SODIUM SERPL-SCNC: 136 MMOL/L (ref 136–145)
WBC # BLD AUTO: 8.92 10*3/MM3 (ref 3.4–10.8)

## 2021-06-13 PROCEDURE — 84100 ASSAY OF PHOSPHORUS: CPT | Performed by: FAMILY MEDICINE

## 2021-06-13 PROCEDURE — 85025 COMPLETE CBC W/AUTO DIFF WBC: CPT | Performed by: FAMILY MEDICINE

## 2021-06-13 PROCEDURE — 25010000002 CEFTRIAXONE PER 250 MG: Performed by: FAMILY MEDICINE

## 2021-06-13 PROCEDURE — 80048 BASIC METABOLIC PNL TOTAL CA: CPT | Performed by: FAMILY MEDICINE

## 2021-06-13 PROCEDURE — 83735 ASSAY OF MAGNESIUM: CPT | Performed by: FAMILY MEDICINE

## 2021-06-13 PROCEDURE — G0378 HOSPITAL OBSERVATION PER HR: HCPCS

## 2021-06-13 PROCEDURE — 99217 PR OBSERVATION CARE DISCHARGE MANAGEMENT: CPT | Performed by: INTERNAL MEDICINE

## 2021-06-13 RX ORDER — ATORVASTATIN CALCIUM 80 MG/1
40 TABLET, FILM COATED ORAL NIGHTLY
Qty: 30 TABLET | Refills: 0 | Status: SHIPPED | OUTPATIENT
Start: 2021-06-13

## 2021-06-13 RX ORDER — LEVETIRACETAM 1000 MG/1
1000 TABLET ORAL EVERY EVENING
Qty: 30 TABLET | Refills: 0 | Status: SHIPPED | OUTPATIENT
Start: 2021-06-13

## 2021-06-13 RX ORDER — LEVETIRACETAM 500 MG/1
500 TABLET ORAL EVERY MORNING
Qty: 30 TABLET | Refills: 0 | Status: SHIPPED | OUTPATIENT
Start: 2021-06-13

## 2021-06-13 RX ORDER — POLYETHYLENE GLYCOL 3350 17 G/17G
17 POWDER, FOR SOLUTION ORAL DAILY
Status: DISCONTINUED | OUTPATIENT
Start: 2021-06-13 | End: 2021-06-13 | Stop reason: HOSPADM

## 2021-06-13 RX ADMIN — ASPIRIN 81 MG: 81 TABLET, COATED ORAL at 08:25

## 2021-06-13 RX ADMIN — ESCITALOPRAM 10 MG: 10 TABLET, FILM COATED ORAL at 08:25

## 2021-06-13 RX ADMIN — SODIUM CHLORIDE, POTASSIUM CHLORIDE, SODIUM LACTATE AND CALCIUM CHLORIDE 75 ML/HR: 600; 310; 30; 20 INJECTION, SOLUTION INTRAVENOUS at 05:24

## 2021-06-13 RX ADMIN — LEVETIRACETAM 500 MG: 500 TABLET, FILM COATED ORAL at 08:25

## 2021-06-13 RX ADMIN — CEFTRIAXONE 1 G: 10 INJECTION, POWDER, FOR SOLUTION INTRAVENOUS at 08:25

## 2021-06-13 RX ADMIN — MULTIPLE VITAMINS W/ MINERALS TAB 1 TABLET: TAB at 08:25

## 2021-06-13 RX ADMIN — LEVOTHYROXINE SODIUM 100 MCG: 100 TABLET ORAL at 05:24

## 2021-06-13 RX ADMIN — SODIUM CHLORIDE, POTASSIUM CHLORIDE, SODIUM LACTATE AND CALCIUM CHLORIDE 75 ML/HR: 600; 310; 30; 20 INJECTION, SOLUTION INTRAVENOUS at 07:51

## 2021-06-13 NOTE — DISCHARGE SUMMARY
[facility]         HOSPITALIST  DISCHARGE SUMMARY    Patient Name: Deepti Ramirez  : 1955  MRN: [id number]    Date of Admission: 6/10/2021  Date of Discharge:  21    Primary Care Physician: Sweetie Duque APRN    Consults     Date and Time Order Name Status Description    2021 11:35 AM Inpatient Neurology Consult Stroke      2021 12:11 AM Inpatient Neurology Consult Other (see comments) (Encephalitis)      6/10/2021 11:03 AM Hospitalist (on-call MD unless specified) Completed     6/10/2021 10:21 AM Inpatient Neurology Consult Stroke Completed           Final Diagnosis:  Expressive aphasia  Encephalopathy; metabolic; due to UTI  Complicated UTI in female of unspecified organism   Recent history of lymphocytic meningitis  Seizure disorder  COPD not in exacerbation  Current smoker  PTSD  Hypothyroidism  Chronic anemia  Anxiety  Depression  Dyslipidemia       Hospital Course     Hospital Course:  65-year-old female with history of COPD, hypothyroidism, bipolar disorder with depression, anxiety, pulmonary hypertension, was hospitalized on 2021 with altered mental status, initial concern was for stroke that was ruled out by MRI.      Patient's degree of encephalopathy was concerning for similar episode she had in 2020 which she had lymphocytic meningitis, she also has a history of seizures post meningitis, she is on Keppra for, neurology is consulted, LP performed.  Cultures resulted negative and meningitis ruled out.     Altered mental status and change in mentation after episode of staring that was possibly possibly a seizure.  Patient's home keppra dose increased and her symptoms resolved.  Patient speaking fluenty without further expressive aphasia.  Suspect her UTI may have also caused her encephalopathy.  A urine culture was ordered but not able to be obtained - given resolution of patients symptoms it appeared responsive to ceftriaxone which was completed  prior to discharge.      Patient discharged in stable condition with close PCP and neurology follow up.    CODE STATUS:  Code Status and Medical Interventions:   Ordered at: 06/10/21 1157     Level Of Support Discussed With:    Patient     Code Status:    CPR     Medical Interventions (Level of Support Prior to Arrest):    Full       Day of Discharge     Vital Signs:  Temp:  [98.1 °F (36.7 °C)-99.1 °F (37.3 °C)] 98.9 °F (37.2 °C)  Heart Rate:  [63-83] 83  Resp:  [18-20] 18  BP: (138-149)/(72-99) 141/83    Physical Exam  Gen: awake, resting in bed, conversant  HENT: eomi, no scleral icterus, no discharge  Resp: CTAB, normal respiratory effort  CV: RRR, no LE pitting edema  GI: Abdomen soft, NT, ND, no guarding, +BS  Psych: appropriate mood and affect, aox3, fluent speech  Neuro: no tremors, strength symmetric    Discharge Details        Discharge Medications      New Medications      Instructions Start Date   atorvastatin 80 MG tablet  Commonly known as: LIPITOR  Notes to patient: New prescription. Take dose tonight, 6/14/21   40 mg, Oral, Nightly         Changes to Medications      Instructions Start Date   levETIRAcetam 500 MG tablet  Commonly known as: KEPPRA  What changed: when to take this  Notes to patient: Medication dose changed. Take 500 mg tablet 1x day in the AM. Start tomorrow 6/14/21.   500 mg, Oral, Every Morning      levETIRAcetam 1000 MG tablet  Commonly known as: Keppra  What changed: You were already taking a medication with the same name, and this prescription was added. Make sure you understand how and when to take each.  Notes to patient: Take the 1,000 mg tablet in the evening. Start taking this dose this evening 6/13/21.    1,000 mg, Oral, Every Evening, This is in addition to your 500mg tablet in the morning         Continue These Medications      Instructions Start Date   aspirin 81 MG EC tablet  Notes to patient: Start tomorrow 6/14/21   81 mg, Daily      B-12 Compliance Injection 1000  MCG/ML kit  Generic drug: Cyanocobalamin   1,000 mcg, Intramuscular, Every 30 Days      escitalopram 10 MG tablet  Commonly known as: LEXAPRO  Notes to patient: Take tomorrow 6/14/21   10 mg, Daily      levothyroxine 100 MCG tablet  Commonly known as: SYNTHROID, LEVOTHROID  Notes to patient: Take tomorrow 6/14/21.   100 mcg, Daily      multivitamin with minerals tablet tablet  Notes to patient: Take tomorrow 6/14/21   1 tablet, Oral, Daily      prazosin 5 MG capsule  Commonly known as: MINIPRESS  Notes to patient: Take tonight, 6/13/21   5 mg, Nightly      vitamin D 1.25 MG (60763 UT) capsule capsule  Commonly known as: ERGOCALCIFEROL   50,000 Units, Oral, Weekly         Stop These Medications    potassium chloride 20 MEQ CR tablet  Commonly known as: K-DURKLOR-CON              Discharge Disposition:  Home or Self Care    Diet: regular as tolerated  Hospital:  Diet Order   Procedures   • Diet Regular       Discharge Activity: advance as tolerated. No driving.        Future Appointments   Date Time Provider Department Center   6/15/2021  8:45 AM Vani Samuel APRN [facility] [place] [facility] [place]       Additional Instructions for the Follow-ups that You Need to Schedule     Discharge Follow-up with PCP   As directed       Currently Documented PCP:    Sweetie Duque APRN    PCP Phone Number:    [phone]     Follow Up Details: 3-5 days         Discharge Follow-up with Specified Provider: neurology - Dr Whelan; 3 Weeks   As directed      To: neurology - Dr Whelan    Follow Up: 3 Weeks        Additional information on Labs and Follow-ups:     You need to call to schedule follow-up appointment for in 3 weeks with Dr. Whelan [phone].            Pertinent  and/or Most Recent Results     PROCEDURES:   EEG  Impression: This EEG is abnormal secondary to slowing of background activity.  This finding is suggestive of a mild to moderate encephalopathy of nonspecific etiology.  No epileptiform discharges are  recorded.        LAB RESULTS:      Lab 06/13/21  0924 06/12/21  0451 06/10/21  2013 06/10/21  1108 06/10/21  0914 06/10/21  0717 06/10/21  0716   WBC 8.92 8.63  --   --   --   --  9.37   HEMOGLOBIN 16.1* 14.4  --   --   --   --  15.7   HEMATOCRIT 46.3 42.1  --   --   --   --  44.6   PLATELETS 207 193  --   --   --   --  200   NEUTROS ABS 6.22 5.30  --   --   --   --  7.65*   IMMATURE GRANS (ABS) 0.04 0.03  --   --   --   --  0.13*   LYMPHS ABS 1.72 2.29  --   --   --   --  1.01   MONOS ABS 0.69 0.79  --   --   --   --  0.50   EOS ABS 0.22 0.17  --   --   --   --  0.04   MCV 94.1 95.2  --   --   --   --  95.3   SED RATE  --   --  8  --   --   --   --    CRP  --   --  2.48*  --   --   --   --    LACTATE  --   --   --  1.2  --   --   --    LACTATE, ARTERIAL  --   --   --   --  1.19  --   --    PROTIME  --   --   --   --   --  9.7  --    APTT  --   --   --   --   --  22.0*  --          Lab 06/13/21  0924 06/12/21  0452 06/12/21  0451 06/10/21  2013 06/10/21  1036 06/10/21  0914 06/10/21  0718 06/10/21  0717   SODIUM 136  --  140  --   --   --   --  137   SODIUM, ARTERIAL  --   --   --   --   --  133.8*  --   --    POTASSIUM 4.3  --  3.7  --   --   --   --  4.1   CHLORIDE 102  --  106  --   --   --   --  103   CO2 22.2  --  23.3  --   --   --   --  22.6   ANION GAP 11.8  --  10.7  --   --   --   --  11.4   BUN 12  --  10  --   --   --   --  21   CREATININE 0.80  --  0.69  --  0.99  --  1.00 0.99   GLUCOSE 128*  --  93  --   --   --   --  163*   GLUCOSE, ARTERIAL  --   --   --   --   --  131*  --   --    CALCIUM 9.2  --  8.7  --   --   --   --  9.4   IONIZED CALCIUM  --   --   --   --   --  1.13  --   --    MAGNESIUM 2.1  --  2.2  --   --   --   --   --    PHOSPHORUS 3.3 2.3*  --   --   --   --   --   --    HEMOGLOBIN A1C  --   --   --  5.84*  --   --   --   --    TSH  --   --   --  0.043*  --   --   --   --          Lab 06/10/21  0717   TOTAL PROTEIN 7.0   ALBUMIN 4.30   GLOBULIN 2.7   ALT (SGPT) 22   AST (SGOT) 13    BILIRUBIN <0.2   ALK PHOS 119*         Lab 06/10/21  0717   TROPONIN T <0.010   PROTIME 9.7   INR 0.86*         Lab 06/11/21  0416   CHOLESTEROL 121   LDL CHOL 59   HDL CHOL 41   TRIGLYCERIDES 117             Lab 06/10/21  0914   PH, ARTERIAL 7.425   PCO2, ARTERIAL 31.6*   PO2 ART 73.9*   O2 SATURATION ART 93.8*   FIO2 28   HCO3 ART 20.3*   BASE EXCESS ART -3.2*   CARBOXYHEMOGLOBIN 0.6     Brief Urine Lab Results  (Last result in the past 365 days)      Color   Clarity   Blood   Leuk Est   Nitrite   Protein   CREAT   Urine HCG        06/10/21 0842 Yellow Cloudy Negative Trace Positive 30 mg/dL (1+)             Microbiology Results (last 10 days)     Procedure Component Value - Date/Time    Meningitis / Encephalitis Panel, PCR - Cerebrospinal Fluid, Lumbar Puncture [[id number]]  (Normal) Collected: 06/12/21 1244    Lab Status: Final result Specimen: Cerebrospinal Fluid from Lumbar Puncture Updated: 06/12/21 1400     ESCHERICHIA COLI K1, PCR Not Detected     HAEMOPHILUS INFLUENZAE, PCR Not Detected     LISTERIA MONOCYTOGENES, PCR Not Detected     NEISSERIA MENINGITIDIS, PCR Not Detected     STREPTOCOCCUS AGALACTIAE, PCR Not Detected     STREPTOCOCCUS PNEUMONIAE, PCR Not Detected     CYTOMEGALOVIRUS (CMV), PCR Not Detected     ENTEROVIRUS, PCR Not Detected     HERPES SIMPLEX VIRUS 1 (HSV-1), PCR Not Detected     HERPES SIMPLEX VIRUS 2 (HSV-2), PCR Not Detected     HUMAN PARECHOVIRUS, PCR Not Detected     VARICELLA ZOSTER VIRUS (VZV), PCR Not Detected     CRYPTOCOCCUS NEOFORMANS / GATTII, PCR Not Detected     HUMAN HERPES VIRUS 6 PCR Not Detected    Culture, CSF - Cerebrospinal Fluid, Lumbar Puncture [[id number]] Collected: 06/11/21 1045    Lab Status: Preliminary result Specimen: Cerebrospinal Fluid from Lumbar Puncture Updated: 06/13/21 0717     CSF Culture No growth at 2 days     Gram Stain No WBCs seen      No organisms seen    Blood Culture - Blood, Arm, Right [[id number]] Collected: 06/10/21 1108    Lab Status:  Preliminary result Specimen: Blood from Arm, Right Updated: 06/13/21 1115     Blood Culture No growth at 3 days          RADIOLOGY:  MRI  CONCLUSION:   1. No acute brain abnormality is seen. No acute infarct. No acute intracranial hemorrhage.   2. There may be moderate to severe chronic small vessel ischemia/infarction.   3. Slight motion artifact obscures detail on some sequences.   4. The other findings are as detailed above.       CT Angio head  CONCLUSION:   1. No intracranial vascular stenosis or occlusion   2. No pathologic intracranial contrast enhancement     CT head w/out  CONCLUSION:   1. No acute intracranial abnormality   2. Patchy areas of low attenuation within the periventricular white matter bilaterally compatible   with chronic small vessel ischemic disease.   3. Findings personally called to Dr. Alfred in the emergency department at 0734 hours on   6/10/2021            Results for orders placed during the hospital encounter of 06/10/21    Duplex Carotid Ultrasound CAR    Interpretation Summary  · All other left side carotid system vessels are normal.  · All other right side carotid system vessels are normal.      Results for orders placed during the hospital encounter of 06/10/21    Adult Transthoracic Echo Complete W/ Cont if Necessary Per Protocol (With Agitated Saline)    Interpretation Summary  · Left ventricular ejection fraction appears to be 51 - 55%.  · There is grade 1 diastolic dysfunction, impaired relaxation of the left ventricle  · There are no hemodynamically significant valvular abnormalities  · Technically difficult study      Labs Pending at Discharge:  Pending Labs     Order Current Status    IgG Index & Synthesis Rate - Cerebrospinal Fluid, Lumbar Puncture In process    Levetiracetam Level (Keppra) In process    MS Profile & MBP, CSF - Cerebrospinal Fluid, Lumbar Puncture In process    N-Methyl-D-Asparate Recpt IgG In process    Oligoclonal Banding (COLLECT RED TUBE) In process     Oligoclonal Banding - Cerebrospinal Fluid, Lumbar Puncture In process    Paraneoplastic Profile 1, CSF - Cerebrospinal Fluid, Lumbar Puncture In process    Paraneoplastic Profile II, CSF - Cerebrospinal Fluid, Lumbar Puncture In process    Blood Culture - Blood, Arm, Right Preliminary result    Culture, CSF - Cerebrospinal Fluid, Lumbar Puncture Preliminary result    POC Creatinine with Hold Tube Final result            Time spent on Discharge including face to face service:  40 minutes

## 2021-06-14 LAB
BACTERIA SPEC AEROBE CULT: NORMAL
GRAM STN SPEC: NORMAL
GRAM STN SPEC: NORMAL
OLIGOCLONAL BANDS.IT SER+CSF QL: NORMAL
QT INTERVAL: 410 MS

## 2021-06-15 LAB
ALB CSF/SERPL: 18 {RATIO} (ref 0–8)
ALB CSF/SERPL: 20 {RATIO} (ref 0–8)
ALBUMIN CSF-MCNC: 76 MG/DL (ref 8–37)
ALBUMIN CSF-MCNC: 77 MG/DL (ref 8–37)
ALBUMIN SERPL-MCNC: 3.9 G/DL (ref 3.8–4.8)
ALBUMIN SERPL-MCNC: 4.2 G/DL (ref 3.8–4.8)
BACTERIA SPEC AEROBE CULT: NORMAL
IGG CSF-MCNC: 7.9 MG/DL (ref 0–6.7)
IGG CSF-MCNC: 8.4 MG/DL (ref 0–6.7)
IGG SERPL-MCNC: 823 MG/DL (ref 586–1602)
IGG SERPL-MCNC: 853 MG/DL (ref 586–1602)
IGG SYNTH RATE SER+CSF CALC-MRATE: 2.2 MG/DAY
IGG SYNTH RATE SER+CSF CALC-MRATE: 4 MG/DAY
IGG/ALB CLEAR SER+CSF-RTO: 0.5 (ref 0–0.7)
IGG/ALB CLEAR SER+CSF-RTO: 0.5 (ref 0–0.7)
IGG/ALB CSF: 0.1 {RATIO} (ref 0–0.25)
IGG/ALB CSF: 0.11 {RATIO} (ref 0–0.25)
LEVETIRACETAM SERPL-MCNC: 8 UG/ML (ref 10–40)
MBP CSF-MCNC: 3.1 NG/ML (ref 0–4.7)
OLIGOCLONAL BANDS.IT SER+CSF QL: ABNORMAL

## 2021-06-16 LAB
HU1 AB TITR CSF IF: NORMAL TITER
HU1 AB TITR CSF IF: NORMAL TITER
HU2 AB TITR CSF IF: NORMAL TITER
HU2 AB TITR CSF IF: NORMAL TITER
PURKINJE CELLS AB TITR CSF IF: NORMAL TITER

## 2021-06-17 LAB — NMDAR IGG TITR SER IF: NORMAL {TITER}

## 2021-07-07 ENCOUNTER — OFFICE VISIT (OUTPATIENT)
Dept: NEUROLOGY | Facility: CLINIC | Age: 66
End: 2021-07-07

## 2021-07-07 VITALS
WEIGHT: 165.3 LBS | BODY MASS INDEX: 32.45 KG/M2 | TEMPERATURE: 97.8 F | DIASTOLIC BLOOD PRESSURE: 68 MMHG | HEART RATE: 97 BPM | SYSTOLIC BLOOD PRESSURE: 115 MMHG | HEIGHT: 60 IN

## 2021-07-07 DIAGNOSIS — G93.41 ENCEPHALOPATHY, METABOLIC: Primary | ICD-10-CM

## 2021-07-07 PROCEDURE — 99215 OFFICE O/P EST HI 40 MIN: CPT | Performed by: PSYCHIATRY & NEUROLOGY

## 2021-07-07 NOTE — ASSESSMENT & PLAN NOTE
She has recovered from her metabolic encephalopathy.  She was diagnosed by another neurologist to have seizures and increase her Keppra to 500 mg in the morning and 1000 mg in the evening.    I discussed with her that she can follow-up with another neurologist who diagnosed her to have partial complex seizures in the future or she can follow-up with her primary care provider to continue prescribing Keppra 500 mg in the morning and at 1000 mg in the evening.  I discussed with her that from a neurologic point of view she is stable and was never witnessed to have any seizures and it was the impression of the other neurologist that she had seizures.

## 2021-07-07 NOTE — PROGRESS NOTES
Chief Complaint  Hospital Follow Up Visit    Subjective          Deepti Ramirez is a 65 y.o. female who presents to [facility]  History of Present Illness  65-year-old woman here for follow-up of her hospital admission from last month.  She was noted to have expressive difficulties and was off balance.  My examination was remarkable for mumbling incomprehensible words and sentences.  She did have intermittent moments in which she was able to carry on sentences.  When I asked her where her  was she told me that he was in [place] getting a car fixed.  When I asked her again she was mumbling words.  She was able to name pen, watch, repeat and read written words.  She was able to read but did not follow written commands.  She was able to read aloud what is your name.  She was able to tell me her name.  She was able to follow commands.  Most of her sentences however are garbled and mumbled.  Her EEG showed slowing the background activity consistent with a mild to moderate encephalopathy of nonspecific etiology.  There were no epileptiform discharges.  She had an MRI of the brain showing no acute brain abnormality seen.  There may be moderate to severe chronic small vessel ischemia/infarction.  She was seen by the other neurologist who started her on Keppra and was diagnosed to have partial complex seizures, altered mental status and urinary tract infection.    The work-up during her hospitalization included a lumbar puncture which was unremarkable with no cells noted.  What was remarkable is elevated protein of 108.3.  Her paraneoplastic profile was negative.  Oligoclonal banding was negative.  Her N methyl  D aspartate was normal.  Sed rate and lactic acid was normal.    Her MRI of the brain was performed October 2020 shows no abnormal signal intensities are seen in the long TR images with evidence of restricted diffusion involving the left greater than the  "right hyper loss.  The long TR signal abnormalities are predominantly in the parieto-occipital regions, bilaterally, but also seen elsewhere.  A top differential consideration for the findings would be posterior reversible encephalopathy syndrome.  She was seen at that time by another neurologist who diagnosed seizure based on history of confusion and she continues to take antiepileptic medication.  She had a lumbar puncture performed at the time and she was diagnosed to have lymphocytic meningitis.  She has a history of intermittent confusion in the past hospitalization prior to that attributed to metabolic encephalopathy.    She has seen another neurologist in the past in his office.  She is here today for follow-up.  She states that she does not recall having any seizures and her  also does not recall witnessing any seizures.    Objective   Vital Signs:   /68 (BP Location: Right arm, Patient Position: Sitting, Cuff Size: Adult)   Pulse 97   Temp 97.8 °F (36.6 °C)   Ht 152.4 cm (60\")   Wt 75 kg (165 lb 4.8 oz)   BMI 32.28 kg/m²     Physical Exam   He is alert, fluent, phasic, follows commands well.  She is fully oriented to time and place.  She is able to tell me general knowledge questions.  She is able to Follow commands well.  Station and gait she is able to tiptoe, heel walk, molly and tandem without difficulty.  Heart is regular rhythm normal in rate.     Assessment and Plan  Diagnoses and all orders for this visit:    1. Encephalopathy, metabolic (Primary)  Assessment & Plan:  She has recovered from her metabolic encephalopathy.  She was diagnosed by another neurologist to have seizures and increase her Keppra to 500 mg in the morning and 1000 mg in the evening.    I discussed with her that she can follow-up with another neurologist who diagnosed her to have partial complex seizures in the future or she can follow-up with her primary care provider to continue prescribing Keppra 500 mg in " the morning and at 1000 mg in the evening.  I discussed with her that from a neurologic point of view she is stable and was never witnessed to have any seizures and it was the impression of the other neurologist that she had seizures.         Total time spent with the patient and coordinating patient care was 45 minutes.    Follow Up  No follow-ups on file.  Patient was given instructions and counseling regarding her condition or for health maintenance advice. Please see specific information pulled into the AVS if appropriate.

## 2021-11-01 PROBLEM — N72 HIGH RISK HUMAN PAPILLOMA VIRUS (HPV) INFECTION OF CERVIX: Status: ACTIVE | Noted: 2021-11-01

## 2021-11-01 PROBLEM — B97.7 HIGH RISK HUMAN PAPILLOMA VIRUS (HPV) INFECTION OF CERVIX: Status: ACTIVE | Noted: 2021-11-01

## 2021-11-01 NOTE — PRE-PROCEDURE INSTRUCTIONS
Patient instructed to have no food past midnight, clears up to 2 hours prior to arrival time. Patient instructed to wear no lotions, jewelry or piercing's day of surgery.  Patient to take all am meds except for asprin.

## 2021-11-02 ENCOUNTER — ANESTHESIA EVENT (OUTPATIENT)
Dept: PERIOP | Facility: HOSPITAL | Age: 66
End: 2021-11-02

## 2021-11-03 ENCOUNTER — ANESTHESIA (OUTPATIENT)
Dept: PERIOP | Facility: HOSPITAL | Age: 66
End: 2021-11-03

## 2021-11-03 ENCOUNTER — HOSPITAL ENCOUNTER (OUTPATIENT)
Facility: HOSPITAL | Age: 66
Setting detail: HOSPITAL OUTPATIENT SURGERY
Discharge: HOME | End: 2021-11-03
Attending: OBSTETRICS & GYNECOLOGY | Admitting: OBSTETRICS & GYNECOLOGY

## 2021-11-03 VITALS
SYSTOLIC BLOOD PRESSURE: 97 MMHG | BODY MASS INDEX: 29.9 KG/M2 | OXYGEN SATURATION: 97 % | RESPIRATION RATE: 20 BRPM | HEART RATE: 86 BPM | TEMPERATURE: 97 F | WEIGHT: 162.48 LBS | DIASTOLIC BLOOD PRESSURE: 61 MMHG | HEIGHT: 62 IN

## 2021-11-03 DIAGNOSIS — B97.7 HIGH RISK HUMAN PAPILLOMA VIRUS (HPV) INFECTION OF CERVIX: Primary | ICD-10-CM

## 2021-11-03 DIAGNOSIS — N72 HIGH RISK HUMAN PAPILLOMA VIRUS (HPV) INFECTION OF CERVIX: Primary | ICD-10-CM

## 2021-11-03 PROCEDURE — 88342 IMHCHEM/IMCYTCHM 1ST ANTB: CPT | Performed by: OBSTETRICS & GYNECOLOGY

## 2021-11-03 PROCEDURE — 25010000002 FENTANYL CITRATE (PF) 50 MCG/ML SOLUTION: Performed by: NURSE ANESTHETIST, CERTIFIED REGISTERED

## 2021-11-03 PROCEDURE — 25010000002 ONDANSETRON PER 1 MG: Performed by: NURSE ANESTHETIST, CERTIFIED REGISTERED

## 2021-11-03 PROCEDURE — 25010000002 KETOROLAC TROMETHAMINE PER 15 MG: Performed by: NURSE ANESTHETIST, CERTIFIED REGISTERED

## 2021-11-03 PROCEDURE — 25010000002 PROPOFOL 10 MG/ML EMULSION: Performed by: NURSE ANESTHETIST, CERTIFIED REGISTERED

## 2021-11-03 PROCEDURE — 88307 TISSUE EXAM BY PATHOLOGIST: CPT | Performed by: OBSTETRICS & GYNECOLOGY

## 2021-11-03 PROCEDURE — 88341 IMHCHEM/IMCYTCHM EA ADD ANTB: CPT | Performed by: OBSTETRICS & GYNECOLOGY

## 2021-11-03 PROCEDURE — 88305 TISSUE EXAM BY PATHOLOGIST: CPT | Performed by: OBSTETRICS & GYNECOLOGY

## 2021-11-03 PROCEDURE — 25010000002 MIDAZOLAM PER 1MG: Performed by: ANESTHESIOLOGY

## 2021-11-03 PROCEDURE — 25010000002 DEXAMETHASONE PER 1 MG: Performed by: NURSE ANESTHETIST, CERTIFIED REGISTERED

## 2021-11-03 RX ORDER — ACETAMINOPHEN 500 MG
1000 TABLET ORAL ONCE
Status: COMPLETED | OUTPATIENT
Start: 2021-11-03 | End: 2021-11-03

## 2021-11-03 RX ORDER — LIDOCAINE HYDROCHLORIDE 20 MG/ML
INJECTION, SOLUTION INFILTRATION; PERINEURAL AS NEEDED
Status: DISCONTINUED | OUTPATIENT
Start: 2021-11-03 | End: 2021-11-03 | Stop reason: SURG

## 2021-11-03 RX ORDER — DEXAMETHASONE SODIUM PHOSPHATE 4 MG/ML
INJECTION, SOLUTION INTRA-ARTICULAR; INTRALESIONAL; INTRAMUSCULAR; INTRAVENOUS; SOFT TISSUE AS NEEDED
Status: DISCONTINUED | OUTPATIENT
Start: 2021-11-03 | End: 2021-11-03 | Stop reason: SURG

## 2021-11-03 RX ORDER — MIDAZOLAM HYDROCHLORIDE 2 MG/2ML
2 INJECTION, SOLUTION INTRAMUSCULAR; INTRAVENOUS ONCE
Status: COMPLETED | OUTPATIENT
Start: 2021-11-03 | End: 2021-11-03

## 2021-11-03 RX ORDER — LIDOCAINE HYDROCHLORIDE AND EPINEPHRINE 10; 10 MG/ML; UG/ML
INJECTION, SOLUTION INFILTRATION; PERINEURAL AS NEEDED
Status: DISCONTINUED | OUTPATIENT
Start: 2021-11-03 | End: 2021-11-03 | Stop reason: HOSPADM

## 2021-11-03 RX ORDER — MAGNESIUM HYDROXIDE 1200 MG/15ML
LIQUID ORAL AS NEEDED
Status: DISCONTINUED | OUTPATIENT
Start: 2021-11-03 | End: 2021-11-03 | Stop reason: HOSPADM

## 2021-11-03 RX ORDER — HYDROCODONE BITARTRATE AND ACETAMINOPHEN 5; 325 MG/1; MG/1
1 TABLET ORAL EVERY 8 HOURS PRN
Qty: 6 TABLET | Refills: 0 | Status: SHIPPED | OUTPATIENT
Start: 2021-11-03

## 2021-11-03 RX ORDER — SODIUM CHLORIDE 0.9 % (FLUSH) 0.9 %
3 SYRINGE (ML) INJECTION EVERY 12 HOURS SCHEDULED
Status: DISCONTINUED | OUTPATIENT
Start: 2021-11-03 | End: 2021-11-03 | Stop reason: HOSPADM

## 2021-11-03 RX ORDER — ACETIC ACID 0.25 G/100ML
IRRIGANT IRRIGATION AS NEEDED
Status: DISCONTINUED | OUTPATIENT
Start: 2021-11-03 | End: 2021-11-03 | Stop reason: HOSPADM

## 2021-11-03 RX ORDER — IBUPROFEN 600 MG/1
600 TABLET ORAL EVERY 8 HOURS PRN
Qty: 9 TABLET | Refills: 0 | Status: SHIPPED | OUTPATIENT
Start: 2021-11-03

## 2021-11-03 RX ORDER — SODIUM CHLORIDE, SODIUM LACTATE, POTASSIUM CHLORIDE, CALCIUM CHLORIDE 600; 310; 30; 20 MG/100ML; MG/100ML; MG/100ML; MG/100ML
9 INJECTION, SOLUTION INTRAVENOUS CONTINUOUS PRN
Status: DISCONTINUED | OUTPATIENT
Start: 2021-11-03 | End: 2021-11-03 | Stop reason: HOSPADM

## 2021-11-03 RX ORDER — FENTANYL CITRATE 50 UG/ML
INJECTION, SOLUTION INTRAMUSCULAR; INTRAVENOUS AS NEEDED
Status: DISCONTINUED | OUTPATIENT
Start: 2021-11-03 | End: 2021-11-03 | Stop reason: SURG

## 2021-11-03 RX ORDER — SODIUM CHLORIDE 0.9 % (FLUSH) 0.9 %
10 SYRINGE (ML) INJECTION AS NEEDED
Status: DISCONTINUED | OUTPATIENT
Start: 2021-11-03 | End: 2021-11-03 | Stop reason: HOSPADM

## 2021-11-03 RX ORDER — KETOROLAC TROMETHAMINE 30 MG/ML
INJECTION, SOLUTION INTRAMUSCULAR; INTRAVENOUS AS NEEDED
Status: DISCONTINUED | OUTPATIENT
Start: 2021-11-03 | End: 2021-11-03 | Stop reason: SURG

## 2021-11-03 RX ORDER — PROPOFOL 10 MG/ML
VIAL (ML) INTRAVENOUS AS NEEDED
Status: DISCONTINUED | OUTPATIENT
Start: 2021-11-03 | End: 2021-11-03 | Stop reason: SURG

## 2021-11-03 RX ORDER — ONDANSETRON 2 MG/ML
INJECTION INTRAMUSCULAR; INTRAVENOUS AS NEEDED
Status: DISCONTINUED | OUTPATIENT
Start: 2021-11-03 | End: 2021-11-03 | Stop reason: SURG

## 2021-11-03 RX ORDER — LUGOLS 0.4 G/G
LIQUID TOPICAL AS NEEDED
Status: DISCONTINUED | OUTPATIENT
Start: 2021-11-03 | End: 2021-11-03 | Stop reason: HOSPADM

## 2021-11-03 RX ADMIN — SODIUM CHLORIDE, POTASSIUM CHLORIDE, SODIUM LACTATE AND CALCIUM CHLORIDE 9 ML/HR: 600; 310; 30; 20 INJECTION, SOLUTION INTRAVENOUS at 08:48

## 2021-11-03 RX ADMIN — PROPOFOL 100 MG: 10 INJECTION, EMULSION INTRAVENOUS at 09:39

## 2021-11-03 RX ADMIN — ONDANSETRON 4 MG: 2 INJECTION INTRAMUSCULAR; INTRAVENOUS at 09:57

## 2021-11-03 RX ADMIN — PROPOFOL 150 MCG/KG/MIN: 10 INJECTION, EMULSION INTRAVENOUS at 09:38

## 2021-11-03 RX ADMIN — FENTANYL CITRATE 50 MCG: 50 INJECTION, SOLUTION INTRAMUSCULAR; INTRAVENOUS at 09:38

## 2021-11-03 RX ADMIN — DEXAMETHASONE SODIUM PHOSPHATE 4 MG: 4 INJECTION INTRA-ARTICULAR; INTRALESIONAL; INTRAMUSCULAR; INTRAVENOUS; SOFT TISSUE at 09:57

## 2021-11-03 RX ADMIN — ACETAMINOPHEN 1000 MG: 500 TABLET ORAL at 08:48

## 2021-11-03 RX ADMIN — LIDOCAINE HYDROCHLORIDE 100 MG: 20 INJECTION, SOLUTION INFILTRATION; PERINEURAL at 09:38

## 2021-11-03 RX ADMIN — KETOROLAC TROMETHAMINE 30 MG: 30 INJECTION, SOLUTION INTRAMUSCULAR; INTRAVENOUS at 10:15

## 2021-11-03 RX ADMIN — MIDAZOLAM HYDROCHLORIDE 2 MG: 1 INJECTION, SOLUTION INTRAMUSCULAR; INTRAVENOUS at 09:14

## 2021-11-03 NOTE — ANESTHESIA POSTPROCEDURE EVALUATION
Patient: Deepti Ramirez    Procedure Summary     Date: 11/03/21 Room / Location: [facility]  / [facility]    Anesthesia Start: 0935 Anesthesia Stop: 1027    Procedures:       LOOP ELECTROCAUTERY EXCISION PROCEDURE (N/A Cervix)      FRACTIONAL DILATATION AND CURETTAGE (N/A Vagina) Diagnosis:       High risk human papilloma virus (HPV) infection of cervix      (High risk human papilloma virus (HPV) infection of cervix [N72, B97.7])    Surgeons: Yady Cha MD Provider: Adolfo Casper MD    Anesthesia Type: general ASA Status: 3          Anesthesia Type: general    Vitals  Vitals Value Taken Time   BP 97/61 11/03/21 1111   Temp 36.1 °C (97 °F) 11/03/21 1110   Pulse 84 11/03/21 1111   Resp 20 11/03/21 1026   SpO2 98 % 11/03/21 1111   Vitals shown include unvalidated device data.        Post Anesthesia Care and Evaluation    Patient location during evaluation: bedside  Patient participation: complete - patient participated  Level of consciousness: awake and alert  Pain management: adequate  Airway patency: patent  Anesthetic complications: No anesthetic complications  PONV Status: none  Cardiovascular status: acceptable  Respiratory status: acceptable  Hydration status: acceptable

## 2021-11-03 NOTE — OP NOTE
LOOP ELECTROCAUTERY EXCISION PROCEDURE, DILATATION AND CURETTAGE  Procedure Report    Patient Name:  Deepti Ramirez  YOB: 1955    Date of Surgery:  11/3/2021     Indications:  Patient with persistent high risk HPV.  History of vulvar carcinoma    Pre-op Diagnosis:   High risk human papilloma virus (HPV) infection of cervix [N72, B97.7]       Post-Op Diagnosis Codes:     * High risk human papilloma virus (HPV) infection of cervix [N72, B97.7]    Procedure/CPT® Codes:      Procedure(s):  LOOP ELECTROCAUTERY EXCISION PROCEDURE  FRACTIONAL DILATATION AND CURETTAGE    Staff:  Surgeon(s):  Yady Cha MD         Anesthesia: Monitored Anesthesia Care    Estimated Blood Loss: 10 mL    Implants:    Nothing was implanted during the procedure    Specimen:          Specimens     ID Source Type Tests Collected By Collected At Frozen?    A Endometrial Curettings Tissue · TISSUE PATHOLOGY EXAM   Yady Cha MD 11/3/21 0956     Description: endometrial currettings    B Endocervix Tissue · TISSUE PATHOLOGY EXAM   Yady Cha MD 11/3/21 0956     Description: endocervical currettings    C Cervix Tissue · TISSUE PATHOLOGY EXAM   Yady Cha MD 11/3/21 1008     Description: portion of cervix    Comment: leep  Open at 0900, 1200 at grey, 0600 pink              Findings: Atrophic and contracted tissues.  Uterus sounds approximately 7 cm.  Scant tissue collected from endocervical and endometrial curettings.  No abnormal vascularity acetowhite epithelium or poor staining with Lugol's were noted.  However the cervix is nulliparous in appearance without good visualization of the transformation zone.    Complications: None    Description of Procedure: The appropriate consents were obtained.  The patient was taken to the operating suite and anesthesia administered.  She was placed in dorsal lithotomy position utilizing candycane stirrups. She was draped in the usual fashion.  She had  and out catheterization of her bladder.  Time out procedure was carried out.  She had pelvic exam under anesthesia.  The coated speculum was placed.  Colposcopy was then performed with dilute acetic acid and Lugol solutions.  Green filter illumination was utilized to assess the vessels.  Findings as noted above.  A small amount of lidocaine was infiltrated into the anterior cervical lip which is then grasped with a single-tooth tenaculum.  Paracervical block was then performed.  Aspiration technique utilized to assess for proper needle placement.  The LEEP was then performed with the appropriate sized loop.  This was followed by the endocervical curettage.  The cervix was dilated with the Hegar dilators to accommodate the serrated curette which was inserted with 1 pass.  The remainder of the tissue was collected with the curette explore a flexible biopsy tip.  Hemostasis was then achieved with the rollerball cautery and Monsel's paste.  The specimens were prepared for transport to the lab.  The patient was left in stable condition awaiting transport to the PACU.          Yady Cha MD     Date: 11/3/2021  Time: 10:38 EDT

## 2021-11-03 NOTE — ANESTHESIA PREPROCEDURE EVALUATION
Anesthesia Evaluation     Patient summary reviewed and Nursing notes reviewed   no history of anesthetic complications:  NPO Solid Status: > 8 hours  NPO Liquid Status: > 2 hours           Airway   Mallampati: II  TM distance: >3 FB  Neck ROM: full  No difficulty expected  Dental      Pulmonary - normal exam    breath sounds clear to auscultation  (+) COPD mild, asthma,  Cardiovascular - normal exam  Exercise tolerance: good (4-7 METS)    Rhythm: regular    (+) DVT, hyperlipidemia,       Neuro/Psych  (+) seizures, TIA, psychiatric history,     GI/Hepatic/Renal/Endo - negative ROS     Musculoskeletal     Abdominal    Substance History - negative use     OB/GYN negative ob/gyn ROS         Other   arthritis,    history of cancer                    Anesthesia Plan    ASA 3     general   (Total IV Anesthesia    Patient understands anesthesia not responsible for dental damage.  )  intravenous induction     Anesthetic plan, all risks, benefits, and alternatives have been provided, discussed and informed consent has been obtained with: patient.    Plan discussed with CRNA.

## 2021-11-08 LAB
CYTO UR: NORMAL
LAB AP CASE REPORT: NORMAL
LAB AP CLINICAL INFORMATION: NORMAL
LAB AP SPECIAL STAINS: NORMAL
PATH REPORT.FINAL DX SPEC: NORMAL
PATH REPORT.GROSS SPEC: NORMAL

## 2022-07-22 ENCOUNTER — TRANSCRIBE ORDERS (OUTPATIENT)
Dept: ADMINISTRATIVE | Facility: HOSPITAL | Age: 67
End: 2022-07-22

## 2022-07-22 DIAGNOSIS — Z12.31 SCREENING MAMMOGRAM FOR HIGH-RISK PATIENT: Primary | ICD-10-CM

## 2022-07-25 ENCOUNTER — TRANSCRIBE ORDERS (OUTPATIENT)
Dept: ADMINISTRATIVE | Facility: HOSPITAL | Age: 67
End: 2022-07-25

## 2022-07-25 DIAGNOSIS — F17.210 CIGARETTE SMOKER: Primary | ICD-10-CM

## 2022-07-29 ENCOUNTER — TRANSCRIBE ORDERS (OUTPATIENT)
Dept: ADMINISTRATIVE | Facility: HOSPITAL | Age: 67
End: 2022-07-29

## 2022-07-29 DIAGNOSIS — Z78.0 POST-MENOPAUSAL: Primary | ICD-10-CM

## 2022-08-10 ENCOUNTER — HOSPITAL ENCOUNTER (OUTPATIENT)
Dept: BONE DENSITY | Facility: HOSPITAL | Age: 67
Discharge: HOME | End: 2022-08-10
Admitting: NURSE PRACTITIONER

## 2022-08-10 DIAGNOSIS — Z78.0 POST-MENOPAUSAL: ICD-10-CM

## 2022-08-10 PROCEDURE — 77080 DXA BONE DENSITY AXIAL: CPT

## 2022-08-12 ENCOUNTER — HOSPITAL ENCOUNTER (OUTPATIENT)
Dept: CT IMAGING | Facility: HOSPITAL | Age: 67
Discharge: HOME | End: 2022-08-12
Admitting: NURSE PRACTITIONER

## 2022-08-12 DIAGNOSIS — F17.210 CIGARETTE SMOKER: ICD-10-CM

## 2022-08-12 PROCEDURE — 71271 CT THORAX LUNG CANCER SCR C-: CPT

## 2022-09-29 ENCOUNTER — HOSPITAL ENCOUNTER (OUTPATIENT)
Dept: MAMMOGRAPHY | Facility: HOSPITAL | Age: 67
Discharge: HOME | End: 2022-09-29
Admitting: NURSE PRACTITIONER

## 2022-09-29 DIAGNOSIS — Z12.31 SCREENING MAMMOGRAM FOR HIGH-RISK PATIENT: ICD-10-CM

## 2022-09-29 PROCEDURE — 77063 BREAST TOMOSYNTHESIS BI: CPT

## 2022-09-29 PROCEDURE — 77067 SCR MAMMO BI INCL CAD: CPT

## 2022-10-21 ENCOUNTER — HOSPITAL ENCOUNTER (OUTPATIENT)
Dept: GENERAL RADIOLOGY | Facility: HOSPITAL | Age: 67
Discharge: HOME | End: 2022-10-21
Admitting: NURSE PRACTITIONER

## 2022-10-21 ENCOUNTER — TRANSCRIBE ORDERS (OUTPATIENT)
Dept: GENERAL RADIOLOGY | Facility: HOSPITAL | Age: 67
End: 2022-10-21

## 2022-10-21 DIAGNOSIS — R05.9 COUGH, UNSPECIFIED TYPE: Primary | ICD-10-CM

## 2022-10-21 DIAGNOSIS — R05.9 COUGH, UNSPECIFIED TYPE: ICD-10-CM

## 2022-10-21 PROCEDURE — 71046 X-RAY EXAM CHEST 2 VIEWS: CPT

## 2022-11-02 ENCOUNTER — TRANSCRIBE ORDERS (OUTPATIENT)
Dept: ADMINISTRATIVE | Facility: HOSPITAL | Age: 67
End: 2022-11-02

## 2022-11-02 DIAGNOSIS — Z12.2 SCREENING FOR LUNG CANCER: Primary | ICD-10-CM

## 2022-11-18 ENCOUNTER — HOSPITAL ENCOUNTER (OUTPATIENT)
Dept: CT IMAGING | Facility: HOSPITAL | Age: 67
Discharge: HOME | End: 2022-11-18
Admitting: THORACIC SURGERY (CARDIOTHORACIC VASCULAR SURGERY)

## 2022-11-18 DIAGNOSIS — Z12.2 SCREENING FOR LUNG CANCER: ICD-10-CM

## 2022-11-18 PROCEDURE — 71271 CT THORAX LUNG CANCER SCR C-: CPT

## 2023-05-04 ENCOUNTER — TRANSCRIBE ORDERS (OUTPATIENT)
Dept: ADMINISTRATIVE | Facility: HOSPITAL | Age: 68
End: 2023-05-04
Payer: COMMERCIAL

## 2023-05-04 DIAGNOSIS — R91.1 PULMONARY NODULE: Primary | ICD-10-CM

## 2023-05-19 ENCOUNTER — HOSPITAL ENCOUNTER (OUTPATIENT)
Dept: CT IMAGING | Facility: HOSPITAL | Age: 68
Discharge: HOME | End: 2023-05-19
Payer: MEDICARE

## 2023-05-19 DIAGNOSIS — R91.1 PULMONARY NODULE: ICD-10-CM

## 2023-05-19 PROCEDURE — 71250 CT THORAX DX C-: CPT

## 2023-07-23 ENCOUNTER — APPOINTMENT (OUTPATIENT)
Dept: GENERAL RADIOLOGY | Facility: HOSPITAL | Age: 68
DRG: 191 | End: 2023-07-23
Payer: MEDICARE

## 2023-07-23 ENCOUNTER — APPOINTMENT (OUTPATIENT)
Dept: CT IMAGING | Facility: HOSPITAL | Age: 68
DRG: 191 | End: 2023-07-23
Payer: MEDICARE

## 2023-07-23 ENCOUNTER — HOSPITAL ENCOUNTER (INPATIENT)
Facility: HOSPITAL | Age: 68
LOS: 2 days | Discharge: HOME | DRG: 191 | End: 2023-07-25
Attending: EMERGENCY MEDICINE | Admitting: INTERNAL MEDICINE
Payer: MEDICARE

## 2023-07-23 DIAGNOSIS — J96.01 ACUTE RESPIRATORY FAILURE WITH HYPOXIA: ICD-10-CM

## 2023-07-23 DIAGNOSIS — R26.2 DIFFICULTY WALKING: ICD-10-CM

## 2023-07-23 DIAGNOSIS — J44.1 COPD EXACERBATION: Primary | ICD-10-CM

## 2023-07-23 LAB
ALBUMIN SERPL-MCNC: 3.9 G/DL (ref 3.5–5.2)
ALBUMIN/GLOB SERPL: 1.4 G/DL
ALP SERPL-CCNC: 109 U/L (ref 39–117)
ALT SERPL W P-5'-P-CCNC: 18 U/L (ref 1–33)
ANION GAP SERPL CALCULATED.3IONS-SCNC: 13.4 MMOL/L (ref 5–15)
ARTERIAL PATENCY WRIST A: POSITIVE
AST SERPL-CCNC: 19 U/L (ref 1–32)
BASE EXCESS BLDA CALC-SCNC: -2.9 MMOL/L (ref -2–2)
BASOPHILS # BLD AUTO: 0.02 10*3/MM3 (ref 0–0.2)
BASOPHILS NFR BLD AUTO: 0.3 % (ref 0–1.5)
BDY SITE: ABNORMAL
BILIRUB SERPL-MCNC: 0.2 MG/DL (ref 0–1.2)
BUN SERPL-MCNC: 11 MG/DL (ref 8–23)
BUN/CREAT SERPL: 14.1 (ref 7–25)
CALCIUM SPEC-SCNC: 9 MG/DL (ref 8.6–10.5)
CHLORIDE SERPL-SCNC: 105 MMOL/L (ref 98–107)
CO2 SERPL-SCNC: 22.6 MMOL/L (ref 22–29)
COHGB MFR BLD: 3.5 % (ref 0–1.5)
CREAT SERPL-MCNC: 0.78 MG/DL (ref 0.57–1)
DEPRECATED RDW RBC AUTO: 45.8 FL (ref 37–54)
EGFRCR SERPLBLD CKD-EPI 2021: 82.9 ML/MIN/1.73
EOSINOPHIL # BLD AUTO: 0.04 10*3/MM3 (ref 0–0.4)
EOSINOPHIL NFR BLD AUTO: 0.6 % (ref 0.3–6.2)
ERYTHROCYTE [DISTWIDTH] IN BLOOD BY AUTOMATED COUNT: 13 % (ref 12.3–15.4)
FHHB: 6.3 % (ref 0–5)
GAS FLOW AIRWAY: 2 LPM
GLOBULIN UR ELPH-MCNC: 2.7 GM/DL
GLUCOSE SERPL-MCNC: 158 MG/DL (ref 65–99)
HCO3 BLDA-SCNC: 21.7 MMOL/L (ref 22–26)
HCT VFR BLD AUTO: 44.9 % (ref 34–46.6)
HGB BLD-MCNC: 16.2 G/DL (ref 12–15.9)
HGB BLDA-MCNC: 15.4 G/DL (ref 11.7–14.6)
HOLD SPECIMEN: NORMAL
HOLD SPECIMEN: NORMAL
IMM GRANULOCYTES # BLD AUTO: 0.02 10*3/MM3 (ref 0–0.05)
IMM GRANULOCYTES NFR BLD AUTO: 0.3 % (ref 0–0.5)
INHALED O2 CONCENTRATION: 28 %
LYMPHOCYTES # BLD AUTO: 0.67 10*3/MM3 (ref 0.7–3.1)
LYMPHOCYTES NFR BLD AUTO: 10.8 % (ref 19.6–45.3)
MCH RBC QN AUTO: 34.6 PG (ref 26.6–33)
MCHC RBC AUTO-ENTMCNC: 36.1 G/DL (ref 31.5–35.7)
MCV RBC AUTO: 95.9 FL (ref 79–97)
METHGB BLD QL: 0.1 % (ref 0–1.5)
MODALITY: ABNORMAL
MONOCYTES # BLD AUTO: 0.65 10*3/MM3 (ref 0.1–0.9)
MONOCYTES NFR BLD AUTO: 10.4 % (ref 5–12)
NEUTROPHILS NFR BLD AUTO: 4.83 10*3/MM3 (ref 1.7–7)
NEUTROPHILS NFR BLD AUTO: 77.6 % (ref 42.7–76)
NRBC BLD AUTO-RTO: 0 /100 WBC (ref 0–0.2)
NT-PROBNP SERPL-MCNC: 239.2 PG/ML (ref 0–900)
OXYHGB MFR BLDV: 90.1 % (ref 94–99)
PCO2 BLDA: 37.5 MM HG (ref 35–45)
PH BLDA: 7.38 PH UNITS (ref 7.35–7.45)
PLATELET # BLD AUTO: 150 10*3/MM3 (ref 140–450)
PMV BLD AUTO: 10.3 FL (ref 6–12)
PO2 BLD: 239 MM[HG] (ref 0–500)
PO2 BLDA: 66.9 MM HG (ref 80–100)
POTASSIUM SERPL-SCNC: 3.8 MMOL/L (ref 3.5–5.2)
PROCALCITONIN SERPL-MCNC: 0.05 NG/ML (ref 0–0.25)
PROT SERPL-MCNC: 6.6 G/DL (ref 6–8.5)
RBC # BLD AUTO: 4.68 10*6/MM3 (ref 3.77–5.28)
SAO2 % BLDCOA: 93.5 % (ref 95–99)
SARS-COV-2 RNA RESP QL NAA+PROBE: NOT DETECTED
SODIUM SERPL-SCNC: 141 MMOL/L (ref 136–145)
TROPONIN T SERPL HS-MCNC: 7 NG/L
WBC NRBC COR # BLD: 6.23 10*3/MM3 (ref 3.4–10.8)
WHOLE BLOOD HOLD COAG: NORMAL
WHOLE BLOOD HOLD SPECIMEN: NORMAL

## 2023-07-23 PROCEDURE — 94799 UNLISTED PULMONARY SVC/PX: CPT

## 2023-07-23 PROCEDURE — 83050 HGB METHEMOGLOBIN QUAN: CPT | Performed by: STUDENT IN AN ORGANIZED HEALTH CARE EDUCATION/TRAINING PROGRAM

## 2023-07-23 PROCEDURE — 83880 ASSAY OF NATRIURETIC PEPTIDE: CPT | Performed by: EMERGENCY MEDICINE

## 2023-07-23 PROCEDURE — 93005 ELECTROCARDIOGRAM TRACING: CPT | Performed by: EMERGENCY MEDICINE

## 2023-07-23 PROCEDURE — 93010 ELECTROCARDIOGRAM REPORT: CPT | Performed by: INTERNAL MEDICINE

## 2023-07-23 PROCEDURE — 87070 CULTURE OTHR SPECIMN AEROBIC: CPT | Performed by: STUDENT IN AN ORGANIZED HEALTH CARE EDUCATION/TRAINING PROGRAM

## 2023-07-23 PROCEDURE — 87635 SARS-COV-2 COVID-19 AMP PRB: CPT | Performed by: EMERGENCY MEDICINE

## 2023-07-23 PROCEDURE — 71260 CT THORAX DX C+: CPT

## 2023-07-23 PROCEDURE — 99285 EMERGENCY DEPT VISIT HI MDM: CPT

## 2023-07-23 PROCEDURE — 82375 ASSAY CARBOXYHB QUANT: CPT | Performed by: STUDENT IN AN ORGANIZED HEALTH CARE EDUCATION/TRAINING PROGRAM

## 2023-07-23 PROCEDURE — 84484 ASSAY OF TROPONIN QUANT: CPT | Performed by: EMERGENCY MEDICINE

## 2023-07-23 PROCEDURE — 80053 COMPREHEN METABOLIC PANEL: CPT | Performed by: EMERGENCY MEDICINE

## 2023-07-23 PROCEDURE — 71045 X-RAY EXAM CHEST 1 VIEW: CPT

## 2023-07-23 PROCEDURE — 25010000002 METHYLPREDNISOLONE PER 125 MG: Performed by: EMERGENCY MEDICINE

## 2023-07-23 PROCEDURE — 82805 BLOOD GASES W/O2 SATURATION: CPT | Performed by: STUDENT IN AN ORGANIZED HEALTH CARE EDUCATION/TRAINING PROGRAM

## 2023-07-23 PROCEDURE — 87205 SMEAR GRAM STAIN: CPT | Performed by: STUDENT IN AN ORGANIZED HEALTH CARE EDUCATION/TRAINING PROGRAM

## 2023-07-23 PROCEDURE — 85025 COMPLETE CBC W/AUTO DIFF WBC: CPT | Performed by: EMERGENCY MEDICINE

## 2023-07-23 PROCEDURE — 94640 AIRWAY INHALATION TREATMENT: CPT

## 2023-07-23 PROCEDURE — 25510000001 IOPAMIDOL PER 1 ML: Performed by: EMERGENCY MEDICINE

## 2023-07-23 PROCEDURE — 93005 ELECTROCARDIOGRAM TRACING: CPT

## 2023-07-23 PROCEDURE — 36600 WITHDRAWAL OF ARTERIAL BLOOD: CPT | Performed by: STUDENT IN AN ORGANIZED HEALTH CARE EDUCATION/TRAINING PROGRAM

## 2023-07-23 PROCEDURE — 84145 PROCALCITONIN (PCT): CPT | Performed by: EMERGENCY MEDICINE

## 2023-07-23 RX ORDER — METHYLPREDNISOLONE SODIUM SUCCINATE 40 MG/ML
40 INJECTION, POWDER, LYOPHILIZED, FOR SOLUTION INTRAMUSCULAR; INTRAVENOUS EVERY 24 HOURS
Status: DISCONTINUED | OUTPATIENT
Start: 2023-07-24 | End: 2023-07-24

## 2023-07-23 RX ORDER — AMOXICILLIN 250 MG
2 CAPSULE ORAL 2 TIMES DAILY
Status: DISCONTINUED | OUTPATIENT
Start: 2023-07-23 | End: 2023-07-25 | Stop reason: HOSPADM

## 2023-07-23 RX ORDER — NITROGLYCERIN 0.4 MG/1
0.4 TABLET SUBLINGUAL
Status: DISCONTINUED | OUTPATIENT
Start: 2023-07-23 | End: 2023-07-25 | Stop reason: HOSPADM

## 2023-07-23 RX ORDER — DOXYCYCLINE 100 MG/1
100 CAPSULE ORAL EVERY 12 HOURS SCHEDULED
Status: DISCONTINUED | OUTPATIENT
Start: 2023-07-23 | End: 2023-07-25 | Stop reason: HOSPADM

## 2023-07-23 RX ORDER — BISACODYL 5 MG/1
5 TABLET, DELAYED RELEASE ORAL DAILY PRN
Status: DISCONTINUED | OUTPATIENT
Start: 2023-07-23 | End: 2023-07-25 | Stop reason: HOSPADM

## 2023-07-23 RX ORDER — SODIUM CHLORIDE 0.9 % (FLUSH) 0.9 %
10 SYRINGE (ML) INJECTION EVERY 12 HOURS SCHEDULED
Status: DISCONTINUED | OUTPATIENT
Start: 2023-07-23 | End: 2023-07-25 | Stop reason: HOSPADM

## 2023-07-23 RX ORDER — SODIUM CHLORIDE 9 MG/ML
40 INJECTION, SOLUTION INTRAVENOUS AS NEEDED
Status: DISCONTINUED | OUTPATIENT
Start: 2023-07-23 | End: 2023-07-25 | Stop reason: HOSPADM

## 2023-07-23 RX ORDER — ENOXAPARIN SODIUM 100 MG/ML
40 INJECTION SUBCUTANEOUS DAILY
Status: DISCONTINUED | OUTPATIENT
Start: 2023-07-24 | End: 2023-07-25 | Stop reason: HOSPADM

## 2023-07-23 RX ORDER — ATORVASTATIN CALCIUM 40 MG/1
40 TABLET, FILM COATED ORAL NIGHTLY
Status: DISCONTINUED | OUTPATIENT
Start: 2023-07-23 | End: 2023-07-25 | Stop reason: HOSPADM

## 2023-07-23 RX ORDER — GUAIFENESIN 600 MG/1
600 TABLET, EXTENDED RELEASE ORAL EVERY 12 HOURS SCHEDULED
Status: DISCONTINUED | OUTPATIENT
Start: 2023-07-23 | End: 2023-07-25 | Stop reason: HOSPADM

## 2023-07-23 RX ORDER — POLYETHYLENE GLYCOL 3350 17 G/17G
17 POWDER, FOR SOLUTION ORAL DAILY PRN
Status: DISCONTINUED | OUTPATIENT
Start: 2023-07-23 | End: 2023-07-25 | Stop reason: HOSPADM

## 2023-07-23 RX ORDER — METHYLPREDNISOLONE SODIUM SUCCINATE 125 MG/2ML
125 INJECTION, POWDER, LYOPHILIZED, FOR SOLUTION INTRAMUSCULAR; INTRAVENOUS ONCE
Status: COMPLETED | OUTPATIENT
Start: 2023-07-23 | End: 2023-07-23

## 2023-07-23 RX ORDER — LEVETIRACETAM 500 MG/1
1000 TABLET ORAL EVERY EVENING
Status: DISCONTINUED | OUTPATIENT
Start: 2023-07-23 | End: 2023-07-25 | Stop reason: HOSPADM

## 2023-07-23 RX ORDER — ALUMINA, MAGNESIA, AND SIMETHICONE 2400; 2400; 240 MG/30ML; MG/30ML; MG/30ML
15 SUSPENSION ORAL EVERY 6 HOURS PRN
Status: DISCONTINUED | OUTPATIENT
Start: 2023-07-23 | End: 2023-07-25 | Stop reason: HOSPADM

## 2023-07-23 RX ORDER — LEVETIRACETAM 500 MG/1
500 TABLET ORAL EVERY MORNING
Status: DISCONTINUED | OUTPATIENT
Start: 2023-07-24 | End: 2023-07-25 | Stop reason: HOSPADM

## 2023-07-23 RX ORDER — LEVOTHYROXINE SODIUM 0.1 MG/1
100 TABLET ORAL
Status: DISCONTINUED | OUTPATIENT
Start: 2023-07-24 | End: 2023-07-25 | Stop reason: HOSPADM

## 2023-07-23 RX ORDER — ASPIRIN 81 MG/1
81 TABLET ORAL DAILY
Status: DISCONTINUED | OUTPATIENT
Start: 2023-07-23 | End: 2023-07-25 | Stop reason: HOSPADM

## 2023-07-23 RX ORDER — CHOLECALCIFEROL (VITAMIN D3) 125 MCG
5 CAPSULE ORAL NIGHTLY PRN
Status: DISCONTINUED | OUTPATIENT
Start: 2023-07-23 | End: 2023-07-25 | Stop reason: HOSPADM

## 2023-07-23 RX ORDER — BISACODYL 10 MG
10 SUPPOSITORY, RECTAL RECTAL DAILY PRN
Status: DISCONTINUED | OUTPATIENT
Start: 2023-07-23 | End: 2023-07-25 | Stop reason: HOSPADM

## 2023-07-23 RX ORDER — SODIUM CHLORIDE 0.9 % (FLUSH) 0.9 %
10 SYRINGE (ML) INJECTION AS NEEDED
Status: DISCONTINUED | OUTPATIENT
Start: 2023-07-23 | End: 2023-07-25 | Stop reason: HOSPADM

## 2023-07-23 RX ORDER — IPRATROPIUM BROMIDE AND ALBUTEROL SULFATE 2.5; .5 MG/3ML; MG/3ML
3 SOLUTION RESPIRATORY (INHALATION)
Status: DISCONTINUED | OUTPATIENT
Start: 2023-07-23 | End: 2023-07-24

## 2023-07-23 RX ORDER — ACETAMINOPHEN 325 MG/1
650 TABLET ORAL EVERY 6 HOURS PRN
Status: DISCONTINUED | OUTPATIENT
Start: 2023-07-23 | End: 2023-07-25 | Stop reason: HOSPADM

## 2023-07-23 RX ORDER — TERAZOSIN 5 MG/1
5 CAPSULE ORAL NIGHTLY
Status: DISCONTINUED | OUTPATIENT
Start: 2023-07-23 | End: 2023-07-25 | Stop reason: HOSPADM

## 2023-07-23 RX ORDER — IPRATROPIUM BROMIDE AND ALBUTEROL SULFATE 2.5; .5 MG/3ML; MG/3ML
3 SOLUTION RESPIRATORY (INHALATION) ONCE
Status: COMPLETED | OUTPATIENT
Start: 2023-07-23 | End: 2023-07-23

## 2023-07-23 RX ORDER — IPRATROPIUM BROMIDE AND ALBUTEROL SULFATE 2.5; .5 MG/3ML; MG/3ML
3 SOLUTION RESPIRATORY (INHALATION)
Status: COMPLETED | OUTPATIENT
Start: 2023-07-23 | End: 2023-07-23

## 2023-07-23 RX ADMIN — ATORVASTATIN CALCIUM 40 MG: 40 TABLET, FILM COATED ORAL at 21:37

## 2023-07-23 RX ADMIN — IOPAMIDOL 100 ML: 755 INJECTION, SOLUTION INTRAVENOUS at 19:40

## 2023-07-23 RX ADMIN — METHYLPREDNISOLONE SODIUM SUCCINATE 125 MG: 125 INJECTION INTRAMUSCULAR; INTRAVENOUS at 15:29

## 2023-07-23 RX ADMIN — IPRATROPIUM BROMIDE AND ALBUTEROL SULFATE 3 ML: .5; 3 SOLUTION RESPIRATORY (INHALATION) at 14:47

## 2023-07-23 RX ADMIN — IPRATROPIUM BROMIDE AND ALBUTEROL SULFATE 3 ML: .5; 3 SOLUTION RESPIRATORY (INHALATION) at 14:42

## 2023-07-23 RX ADMIN — LEVETIRACETAM 1000 MG: 500 TABLET, FILM COATED ORAL at 21:37

## 2023-07-23 RX ADMIN — TERAZOSIN HYDROCHLORIDE 5 MG: 5 CAPSULE ORAL at 21:37

## 2023-07-23 RX ADMIN — IPRATROPIUM BROMIDE AND ALBUTEROL SULFATE 3 ML: .5; 3 SOLUTION RESPIRATORY (INHALATION) at 22:49

## 2023-07-23 RX ADMIN — ASPIRIN 81 MG: 81 TABLET, COATED ORAL at 21:37

## 2023-07-23 RX ADMIN — IPRATROPIUM BROMIDE AND ALBUTEROL SULFATE 3 ML: .5; 3 SOLUTION RESPIRATORY (INHALATION) at 14:52

## 2023-07-23 RX ADMIN — IPRATROPIUM BROMIDE AND ALBUTEROL SULFATE 3 ML: .5; 3 SOLUTION RESPIRATORY (INHALATION) at 18:13

## 2023-07-23 RX ADMIN — DOXYCYCLINE 100 MG: 100 CAPSULE ORAL at 21:37

## 2023-07-23 RX ADMIN — GUAIFENESIN 600 MG: 600 TABLET ORAL at 21:36

## 2023-07-23 RX ADMIN — Medication 10 ML: at 21:37

## 2023-07-23 NOTE — H&P
[facility]   HOSPITALIST HISTORY AND PHYSICAL  Date: 2023   Patient Name: Deepti Ramirez  : 1955  MRN: [id number]  Primary Care Physician:  Sweetie Duque APRN  Date of admission: 2023    Subjective   Subjective     Chief Complaint: Shortness of breath    HPI:    Deepti Ramirez is a 67 y.o. female past medical history of bipolar disorder, prior DVT no longer on anticoagulation, COPD not on oxygen, TIA, seizure disorder on Keppra who presents to the ER due to worsening shortness of breath.  Patient is a relatively poor historian and cannot tell me why she is no longer on anticoagulation for her prior history of DVT but states for the last 2 to 3 days she has had worsening exertional dyspnea as well as a worsening cough with associated fevers.  She has not checked her temperature at home however but does state she has been febrile.  She denies any sick contacts but continues to smoke a pack per day.  Due to the worsening dyspnea she came into the ER today for evaluation.  Upon arrival here she has a low-grade temperature of 100.2.  She is tachycardic to 105 and saturating in the mid 80s on room air requiring initiation of oxygen.  She was given steroids and nebulizer treatments for suspected COPD exacerbation.  The hospitalist service encounter for admission but at the time my exam patient appears persistently short of breath although she states that she feels better.  Denies having any chest pain.  No lower extremity edema or long sedentary periods.  No recent surgery.  She states that she has recently had an outpatient chest CT but was not put on any medicine to control her COPD.      Personal History     Past Medical History:  Past Medical History:   Diagnosis Date    Anemia     Anxiety     Arthritis     Asthma     Chronic obstructive pulmonary disease     Dementia     per  starting to get dementia    Depression     DVT (deep venous thrombosis)     History of pulmonary embolus  (PE)     Meningitis 10/2020    Metabolic encephalopathy     Seasonal allergies     Seizure     dr. Powell     Sinus infection     Thyroid disorder     TIA (transient ischemic attack)     Vulva cancer     vulva         Past Surgical History:  Past Surgical History:   Procedure Laterality Date    COLONOSCOPY  04/22/2019    DILATATION AND CURETTAGE N/A 11/3/2021    Procedure: FRACTIONAL DILATATION AND CURETTAGE;  Surgeon: Yady Cha MD;  Location: [facility];  Service: Gynecology;  Laterality: N/A;    IVC FILTER RETRIEVAL      filter placed and removal     LEEP N/A 11/3/2021    Procedure: LOOP ELECTROCAUTERY EXCISION PROCEDURE;  Surgeon: Yady Cha MD;  Location: [facility];  Service: Gynecology;  Laterality: N/A;    ORIF TIBIA/FIBULA FRACTURES      TEETH EXTRACTION      2 teeth pulled     VULVECTOMY  2004         Family History:   Reviewed and noncontributory except as mentioned in HPI    Social History:   Social Determinants of Health     Tobacco Use: Not on file   Alcohol Use: Not on file   Financial Resource Strain: Not on file   Food Insecurity: Not on file   Transportation Needs: Not on file   Physical Activity: Not on file   Stress: Not on file   Social Connections: Not on file   Intimate Partner Violence: Not on file   Depression: Not on file   Housing Stability: Not on file         Home Medications:  Cyanocobalamin, HYDROcodone-acetaminophen, aspirin, atorvastatin, escitalopram, ibuprofen, levETIRAcetam, levothyroxine, multivitamin with minerals, prazosin, and vitamin D    Allergies:  Allergies   Allergen Reactions    Sulfa Antibiotics Swelling and Rash    Nickel Rash       Review of Systems   All systems were reviewed and negative except for: Shortness of breath, cough    Objective   Objective     Vitals:   Temp:  [100.2 °F (37.9 °C)] 100.2 °F (37.9 °C)  Heart Rate:  [] 109  Resp:  [18-20] 18  BP: (120-157)/(74-86) 157/86  Flow (L/min):  [2] 2    Physical  Exam    Constitutional: Awake, alert, no acute distress   Eyes: Pupils equal, sclerae anicteric, no conjunctival injection   HENT: NCAT, mucous membranes moist   Neck: Supple, no thyromegaly, no lymphadenopathy, trachea midline   Respiratory: Decreased breath sounds at the bases bilaterally with some scattered rhonchi and wheezing, mildly labored respirations   Cardiovascular: Tachycardic with regular rhythm, no murmurs, rubs, or gallops, palpable pedal pulses bilaterally   Gastrointestinal: Positive bowel sounds, soft, nontender, nondistended   Musculoskeletal: No bilateral ankle edema, no clubbing or cyanosis to extremities   Psychiatric: Appropriate affect, cooperative   Neurologic: Oriented x 3, strength symmetric in all extremities, Cranial Nerves grossly intact to confrontation, speech clear   Skin: No rashes     Result Review    Result Review:  I have personally reviewed the results from the time of this admission to 7/23/2023 18:34 EDT and agree with these findings:  [x]  Laboratory  [x]  Microbiology  [x]  Radiology  [x]  EKG/Telemetry   [x]  Cardiology/Vascular   []  Pathology  [x]  Old records  []  Other:      Assessment & Plan   Assessment / Plan     Assessment/Plan:   Acute hypoxic respiratory failure likely secondary to COPD exacerbation  Acute COPD exacerbation  History of seizure disorder  PTSD  Hypothyroidism  Anxiety/depression/bipolar disorder  Hyperlipidemia  Prior history of meningitis  History of DVT  Active tobacco use      Plan  - Admit to hospitalist service  - We will check a CTA to rule out any PE given her history of DVT no longer being on anticoagulation and also to assess the mention of interstitial pattern seen on the chest x-ray.  We will treat with empiric steroids, nebulizers and doxycycline for COPD exacerbation with productive cough and worsening dyspnea  - EKG personally reviewed showed sinus tachycardia with some nonspecific ST-T wave changes in the anterolateral distribution.   Troponin negative  - We will restart home seizure medicine  -Patient will benefit from outpatient pulmonary follow-up to further characterize her lung disease  - Patient was counseled on the need to stop smoking        Discussed with ER Physician and Nurse    All labs/imaging studies were personally reviewed and findings are as noted above      DVT Prophylaxis: Lovenox    CODE STATUS:    Level Of Support Discussed With: Patient  Code Status (Patient has no pulse and is not breathing): CPR (Attempt to Resuscitate)  Medical Interventions (Patient has pulse or is breathing): Full Support  Release to patient: Routine Release      Admission Status:  I believe this patient meets inpatient status.    Electronically signed by Tutu Francisco MD, 07/23/23, 6:34 PM EDT.

## 2023-07-23 NOTE — ED PROVIDER NOTES
Time: 2:16 PM EDT  Date of encounter:  7/23/2023  Independent Historian/Clinical History and Information was obtained by:   Patient    History is limited by: N/A    Chief Complaint: Shortness of breath      History of Present Illness:  Patient is a 67 y.o. year old female who presents to the emergency department for evaluation of shortness of breath.  She was on vacation last week and came home on this Friday feeling fine.  She woke up Saturday morning and was having shortness of breath with wheezing, cough, fever (a 100 yesterday), and green sputum.  He does have a history of COPD but does not wear oxygen.  Patient reports that today she went to the [facility] but they cannot do a chest x-ray and they referred her to [facility].  At [facility] she was found to be hypoxic 86% and they sent her to the ER via EMS.  Patient reports that she did get a nebulizer treatment prior to arrival.  Patient continues to smoke 1 pack of cigarettes per day.      HPI    Patient Care Team  Primary Care Provider: Sweetie Duque APRN    Past Medical History:     Allergies   Allergen Reactions    Sulfa Antibiotics Swelling and Rash    Nickel Rash     Past Medical History:   Diagnosis Date    Anemia     Anxiety     Arthritis     Asthma     Chronic obstructive pulmonary disease     Dementia     per  starting to get dementia    Depression     DVT (deep venous thrombosis)     History of pulmonary embolus (PE)     Meningitis 10/2020    Metabolic encephalopathy     Seasonal allergies     Seizure     dr. Powell     Sinus infection     Thyroid disorder     TIA (transient ischemic attack)     Vulva cancer     vulva     Past Surgical History:   Procedure Laterality Date    COLONOSCOPY  04/22/2019    DILATATION AND CURETTAGE N/A 11/3/2021    Procedure: FRACTIONAL DILATATION AND CURETTAGE;  Surgeon: Yady Cha MD;  [facility]: [facility];  Service: Gynecology;  Laterality: N/A;    IVC FILTER  RETRIEVAL      filter placed and removal     LEEP N/A 11/3/2021    Procedure: LOOP ELECTROCAUTERY EXCISION PROCEDURE;  Surgeon: Yady Cha MD;  [place] [facility] [place] [facility]  Service: Gynecology;  Laterality: N/A;    ORIF TIBIA/FIBULA FRACTURES      TEETH EXTRACTION      2 teeth pulled     VULVECTOMY  2004     Family History   Problem Relation Age of Onset    Arthritis Mother     Hypertension Mother     Diabetes Mother     Heart disease Father     Hypertension Father     Diabetes Father     Diabetes Sister        Home Medications:  Prior to Admission medications    Medication Sig Start Date End Date Taking? Authorizing Provider   aspirin (aspirin) 81 MG EC tablet Take 81 mg by mouth Daily.    Sanju Garcia MD   atorvastatin (LIPITOR) 80 MG tablet Take 0.5 tablets by mouth Every Night. 6/13/21   Quinn Carson MD   Cyanocobalamin (B-12 Compliance Injection) 1000 MCG/ML kit Inject 1,000 mcg into the appropriate muscle as directed by prescriber Every 30 (Thirty) Days.    Sanju Garcia MD   escitalopram (LEXAPRO) 10 MG tablet 10 mg Daily.    Sanju Garcia MD   HYDROcodone-acetaminophen (Norco) 5-325 MG per tablet Take 1 tablet by mouth Every 8 (Eight) Hours As Needed for Moderate Pain . 11/3/21   Yady Cha MD   ibuprofen (ADVIL,MOTRIN) 600 MG tablet Take 1 tablet by mouth Every 8 (Eight) Hours As Needed for Moderate Pain . 11/3/21   Yady Cha MD   levETIRAcetam (Keppra) 1000 MG tablet Take 1 tablet by mouth Every Evening. This is in addition to your 500mg tablet in the morning 6/13/21   Quinn Carson MD   levETIRAcetam (KEPPRA) 500 MG tablet Take 1 tablet by mouth Every Morning. 6/13/21   Quinn Carson MD   levothyroxine (SYNTHROID, LEVOTHROID) 100 MCG tablet 100 mcg Daily.    Sanju Garcia MD   multivitamin with minerals tablet tablet Take 1 tablet by mouth Daily.    Sanju Garcia MD   prazosin (MINIPRESS) 5 MG capsule 5 mg Every Night.     "ProviderSanju MD   vitamin D (ERGOCALCIFEROL) 1.25 MG (70229 UT) capsule capsule Take 50,000 Units by mouth 1 (One) Time Per Week.    ProviderSanju MD        Social History:   Social History     Tobacco Use    Smoking status: Every Day     Packs/day: 0.50     Years: 30.00     Pack years: 15.00     Types: Cigarettes    Smokeless tobacco: Never    Tobacco comments:     last cig around 0630a   Vaping Use    Vaping Use: Never used   Substance Use Topics    Alcohol use: Never    Drug use: Never         Review of Systems:  Review of Systems   Constitutional:  Positive for fever. Negative for chills.   HENT:  Negative for congestion, ear pain and sore throat.    Eyes:  Negative for pain.   Respiratory:  Positive for cough (Productive with green sputum) and shortness of breath. Negative for chest tightness.    Cardiovascular:  Negative for chest pain.   Gastrointestinal:  Negative for abdominal pain, diarrhea, nausea and vomiting.   Genitourinary:  Negative for flank pain and hematuria.   Musculoskeletal:  Negative for joint swelling.   Skin:  Negative for pallor.   Neurological:  Positive for headaches. Negative for seizures.   All other systems reviewed and are negative.     Physical Exam:  /74   Pulse 109   Temp 100.2 °F (37.9 °C) (Oral)   Resp 18   Ht 156.2 cm (61.5\")   Wt 70.9 kg (156 lb 4.9 oz)   SpO2 93%   BMI 29.06 kg/m²     Vital signs were reviewed under triage note.  General appearance - Patient appears well-developed and well-nourished.  Patient is in no acute distress.  Head - Normocephalic, atraumatic.  Pupils - Equal, round, reactive to light.  Extraocular muscles are intact.  Conjunctiva is clear.  Nasal - Normal inspection.  No evidence of trauma or epistaxis.  Tympanic membranes - Gray, intact without erythema or retractions.  Oral mucosa - Pink and moist without lesions or erythema.  Uvula is midline.  Chest wall - Atraumatic.  Chest wall is nontender.  There are no vesicular " rashes noted.  Neck - Supple.  Trachea was midline.  There is no palpable lymphadenopathy or thyromegaly.  There are no meningeal signs  Lungs - Patient is mildly tachypneic.  Auscultation reveals coarse breath sounds with scattered wheezes and rhonchi/crackles.  Breath sounds were diminished in the bases.    Heart - Mildly tachycardic rate but with a regular rhythm without any murmurs, clicks, or gallops.  Abdomen - Soft.  Bowel sounds are present.  There is no palpable tenderness.  There is no rebound, guarding, or rigidity.  There are no palpable masses.  There are no pulsatile masses.  Back - Spine is straight and midline.  There is no CVA tenderness.  Extremities - Intact x4 with full range of motion.  There is no palpable edema.  Pulses are intact x4 and equal.  Neurologic - Patient is awake, alert, and oriented x3.  Cranial nerves II through XII are grossly intact.  Motor and sensory functions grossly intact.  Cerebellar function was normal.  Integument - There are no rashes.  There are no petechia or purpura lesions noted.  There are no vesicular lesions noted.            Procedures:  Procedures      Medical Decision Making:      Comorbidities that affect care:    COPD, thyroid disease, anxiety, asthma, depression    External Notes reviewed:    Hospital Discharge Summary: Hospital discharge summary from 6/13/2021 was reviewed by me.      The following orders were placed and all results were independently analyzed by me:  Orders Placed This Encounter   Procedures    COVID PRE-OP / PRE-PROCEDURE SCREENING ORDER (NO ISOLATION) - Swab, Nasopharynx    COVID-19,CEPHEID/JIMMY,COR/SETH/PAD/JAYCEE/MAD IN-HOUSE(OR EMERGENT/ADD-ON),NP SWAB IN TRANSPORT MEDIA 3-4 HR TAT, RT-PCR - Swab, Nasopharynx    XR Chest 1 View    [facility] Draw    Comprehensive Metabolic Panel    BNP    Single High Sensitivity Troponin T    CBC Auto Differential    Procalcitonin    NPO Diet NPO Type: Strict NPO    Undress & Gown    Continuous Pulse  Oximetry    Vital Signs    Hospitalist (on-call MD unless specified)    Oxygen Therapy- Nasal Cannula; Titrate 1-6 LPM Per SpO2; 90 - 95%    ECG 12 Lead ED Triage Standing Order; SOA    Insert Peripheral IV    CBC & Differential    Green Top (Gel)    Lavender Top    Gold Top - SST    Light Blue Top       Medications Given in the Emergency Department:  Medications   sodium chloride 0.9 % flush 10 mL (has no administration in time range)   ipratropium-albuterol (DUO-NEB) nebulizer solution 3 mL (3 mL Nebulization Given 7/23/23 1452)   methylPREDNISolone sodium succinate (SOLU-Medrol) injection 125 mg (125 mg Intravenous Given 7/23/23 1529)   ipratropium-albuterol (DUO-NEB) nebulizer solution 3 mL (3 mL Nebulization Given 7/23/23 1813)        ED Course:    ED Course as of 07/23/23 1828   Sun Jul 23, 2023 1826 EKG performed at 1328 was interpreted by me to show a sinus tachycardia with a ventricular of 100 bpm.  The parable is 153 ms.  P waves are normal.  QRS interval is normal.  Axis is at 79 degrees.  There is no acute ischemic ST or T wave change identified.  This EKG was compared to prior dated 6/11/2021 and is unchanged. [TB]      ED Course User Index  [TB] Darwin Arriaga DO   The patient was seen and evaluated the ED by me.  The above history and physical examination was performed as document.  Diagnostic data was obtained.  Results reviewed.  Patient was seen after her 3 initial breathing treatments given in the ED.  Patient still was coarse sounding.  Patient was given IV Solu-Medrol.  Patient was monitored for period of time.  Patient is given a trial of not being on oxygen.  Patient would desat down to 86 to 88%.  Subsequently, patient was placed back on supplemental O2.  Patient required hospitalization.  Hospital service was consulted.    Labs:    Lab Results (last 24 hours)       Procedure Component Value Units Date/Time    CBC & Differential [[id number]]  (Abnormal) Collected: 07/23/23 1430    Specimen:  Blood Updated: 07/23/23 1436    Narrative:      The following orders were created for panel order CBC & Differential.  Procedure                               Abnormality         Status                     ---------                               -----------         ------                     CBC Auto Differential[[id number]]        Abnormal            Final result                 Please view results for these tests on the individual orders.    CBC Auto Differential [[id number]]  (Abnormal) Collected: 07/23/23 1430    Specimen: Blood Updated: 07/23/23 1436     WBC 6.23 10*3/mm3      RBC 4.68 10*6/mm3      Hemoglobin 16.2 g/dL      Hematocrit 44.9 %      MCV 95.9 fL      MCH 34.6 pg      MCHC 36.1 g/dL      RDW 13.0 %      RDW-SD 45.8 fl      MPV 10.3 fL      Platelets 150 10*3/mm3      Neutrophil % 77.6 %      Lymphocyte % 10.8 %      Monocyte % 10.4 %      Eosinophil % 0.6 %      Basophil % 0.3 %      Immature Grans % 0.3 %      Neutrophils, Absolute 4.83 10*3/mm3      Lymphocytes, Absolute 0.67 10*3/mm3      Monocytes, Absolute 0.65 10*3/mm3      Eosinophils, Absolute 0.04 10*3/mm3      Basophils, Absolute 0.02 10*3/mm3      Immature Grans, Absolute 0.02 10*3/mm3      nRBC 0.0 /100 WBC     Comprehensive Metabolic Panel [[id number]]  (Abnormal) Collected: 07/23/23 1725    Specimen: Blood Updated: 07/23/23 1750     Glucose 158 mg/dL      BUN 11 mg/dL      Creatinine 0.78 mg/dL      Sodium 141 mmol/L      Potassium 3.8 mmol/L      Chloride 105 mmol/L      CO2 22.6 mmol/L      Calcium 9.0 mg/dL      Total Protein 6.6 g/dL      Albumin 3.9 g/dL      ALT (SGPT) 18 U/L      AST (SGOT) 19 U/L      Alkaline Phosphatase 109 U/L      Total Bilirubin 0.2 mg/dL      Globulin 2.7 gm/dL      A/G Ratio 1.4 g/dL      BUN/Creatinine Ratio 14.1     Anion Gap 13.4 mmol/L      eGFR 82.9 mL/min/1.73     Narrative:      GFR Normal >60  Chronic Kidney Disease <60  Kidney Failure <15      BNP [[id number]]  (Normal) Collected: 07/23/23 1725     Specimen: Blood Updated: 07/23/23 1747     proBNP 239.2 pg/mL     Narrative:      Among patients with dyspnea, NT-proBNP is highly sensitive for the detection of acute congestive heart failure. In addition NT-proBNP of <300 pg/ml effectively rules out acute congestive heart failure with 99% negative predictive value.      Single High Sensitivity Troponin T [[id number]]  (Normal) Collected: 07/23/23 1725    Specimen: Blood Updated: 07/23/23 1750     HS Troponin T 7 ng/L     Narrative:      High Sensitive Troponin T Reference Range:  <10.0 ng/L- Negative Female for AMI  <15.0 ng/L- Negative Male for AMI  >=10 - Abnormal Female indicating possible myocardial injury.  >=15 - Abnormal Male indicating possible myocardial injury.   Clinicians would have to utilize clinical acumen, EKG, Troponin, and serial changes to determine if it is an Acute Myocardial Infarction or myocardial injury due to an underlying chronic condition.         Procalcitonin [[id number]] Collected: 07/23/23 1725    Specimen: Blood Updated: 07/23/23 1817             Imaging:    XR Chest 1 View    Result Date: 7/23/2023  PROCEDURE: XR CHEST 1 VW  COMPARISON: [facility], CT, CT CHEST WO CONTRAST DIAGNOSTIC, 5/19/2023, 18:14.  [facility], CR, XR CHEST 2 VW, 10/21/2022, 13:14.  INDICATIONS: Shortness of Breath; Cough; Fever  FINDINGS:  The heart is normal in size.  The lungs are well-expanded.  The pulmonary interstitium remains diffusely prominent.  No consolidation or mass is evident.  Sub cm lung nodules were seen on CT scan 5/19/2023.  Bony structures appear intact.        The pulmonary interstitium remains diffusely prominent.  No consolidation or mass is evident.       BRYSON CARDENAS MD       Electronically Signed and Approved By: BRYSON CARDENAS MD on 7/23/2023 at 14:06                Differential Diagnosis and Discussion:    Dyspnea: Differential diagnosis includes but is not limited to metabolic acidosis,  neurological disorders, psychogenic, asthma, pneumothorax, upper airway obstruction, COPD, pneumonia, noncardiogenic pulmonary edema, interstitial lung disease, anemia, congestive heart failure, and pulmonary embolism    All labs were reviewed and interpreted by me.  All X-rays impressions were independently interpreted by me.  EKG was interpreted by me.    MDM     Amount and/or Complexity of Data Reviewed  Tests in the radiology section of CPT®: reviewed  Tests in the medicine section of CPT®: reviewed             Patient Care Considerations:    CT CHEST: I considered ordering a CT scan of the chest, however this can be performed inpatient if deemed further necessary.      Consultants/Shared Management Plan:    Hospitalist: I have discussed the case with Dr. Francisco who agrees to accept the patient for admission.    Social Determinants of Health:    Patient is independent, reliable, and has access to care.       Disposition and Care Coordination:    Admit:   Through independent evaluation of the patient's history, physical, and imperical data, the patient meets criteria for observation/admission to the hospital.        Final diagnoses:   COPD exacerbation   Acute respiratory failure with hypoxia        ED Disposition       ED Disposition   Decision to Admit    Condition   --    Comment   --               This medical record created using voice recognition software.             Darwin Arriaga DO  07/23/23 [id number]

## 2023-07-24 LAB
ANION GAP SERPL CALCULATED.3IONS-SCNC: 10.9 MMOL/L (ref 5–15)
BASOPHILS # BLD AUTO: 0 10*3/MM3 (ref 0–0.2)
BASOPHILS NFR BLD AUTO: 0 % (ref 0–1.5)
BUN SERPL-MCNC: 16 MG/DL (ref 8–23)
BUN/CREAT SERPL: 22.5 (ref 7–25)
CALCIUM SPEC-SCNC: 8.8 MG/DL (ref 8.6–10.5)
CHLORIDE SERPL-SCNC: 104 MMOL/L (ref 98–107)
CO2 SERPL-SCNC: 22.1 MMOL/L (ref 22–29)
CREAT SERPL-MCNC: 0.71 MG/DL (ref 0.57–1)
DEPRECATED RDW RBC AUTO: 46.4 FL (ref 37–54)
EGFRCR SERPLBLD CKD-EPI 2021: 92.7 ML/MIN/1.73
EOSINOPHIL # BLD AUTO: 0 10*3/MM3 (ref 0–0.4)
EOSINOPHIL NFR BLD AUTO: 0 % (ref 0.3–6.2)
ERYTHROCYTE [DISTWIDTH] IN BLOOD BY AUTOMATED COUNT: 12.7 % (ref 12.3–15.4)
GLUCOSE SERPL-MCNC: 158 MG/DL (ref 65–99)
HCT VFR BLD AUTO: 45 % (ref 34–46.6)
HGB BLD-MCNC: 15.2 G/DL (ref 12–15.9)
IMM GRANULOCYTES # BLD AUTO: 0.01 10*3/MM3 (ref 0–0.05)
IMM GRANULOCYTES NFR BLD AUTO: 0.2 % (ref 0–0.5)
LYMPHOCYTES # BLD AUTO: 0.38 10*3/MM3 (ref 0.7–3.1)
LYMPHOCYTES NFR BLD AUTO: 8.2 % (ref 19.6–45.3)
MAGNESIUM SERPL-MCNC: 2.3 MG/DL (ref 1.6–2.4)
MCH RBC QN AUTO: 33.3 PG (ref 26.6–33)
MCHC RBC AUTO-ENTMCNC: 33.8 G/DL (ref 31.5–35.7)
MCV RBC AUTO: 98.7 FL (ref 79–97)
MONOCYTES # BLD AUTO: 0.2 10*3/MM3 (ref 0.1–0.9)
MONOCYTES NFR BLD AUTO: 4.3 % (ref 5–12)
NEUTROPHILS NFR BLD AUTO: 4.05 10*3/MM3 (ref 1.7–7)
NEUTROPHILS NFR BLD AUTO: 87.3 % (ref 42.7–76)
NRBC BLD AUTO-RTO: 0 /100 WBC (ref 0–0.2)
PLATELET # BLD AUTO: 154 10*3/MM3 (ref 140–450)
PMV BLD AUTO: 10.3 FL (ref 6–12)
POTASSIUM SERPL-SCNC: 4.7 MMOL/L (ref 3.5–5.2)
RBC # BLD AUTO: 4.56 10*6/MM3 (ref 3.77–5.28)
SODIUM SERPL-SCNC: 137 MMOL/L (ref 136–145)
WBC NRBC COR # BLD: 4.64 10*3/MM3 (ref 3.4–10.8)

## 2023-07-24 PROCEDURE — 94664 DEMO&/EVAL PT USE INHALER: CPT

## 2023-07-24 PROCEDURE — 83735 ASSAY OF MAGNESIUM: CPT | Performed by: STUDENT IN AN ORGANIZED HEALTH CARE EDUCATION/TRAINING PROGRAM

## 2023-07-24 PROCEDURE — 94799 UNLISTED PULMONARY SVC/PX: CPT

## 2023-07-24 PROCEDURE — 80048 BASIC METABOLIC PNL TOTAL CA: CPT | Performed by: STUDENT IN AN ORGANIZED HEALTH CARE EDUCATION/TRAINING PROGRAM

## 2023-07-24 PROCEDURE — 25010000002 ENOXAPARIN PER 10 MG: Performed by: STUDENT IN AN ORGANIZED HEALTH CARE EDUCATION/TRAINING PROGRAM

## 2023-07-24 PROCEDURE — 85025 COMPLETE CBC W/AUTO DIFF WBC: CPT | Performed by: STUDENT IN AN ORGANIZED HEALTH CARE EDUCATION/TRAINING PROGRAM

## 2023-07-24 PROCEDURE — 36415 COLL VENOUS BLD VENIPUNCTURE: CPT | Performed by: STUDENT IN AN ORGANIZED HEALTH CARE EDUCATION/TRAINING PROGRAM

## 2023-07-24 PROCEDURE — 25010000002 METHYLPREDNISOLONE PER 40 MG: Performed by: STUDENT IN AN ORGANIZED HEALTH CARE EDUCATION/TRAINING PROGRAM

## 2023-07-24 RX ORDER — IPRATROPIUM BROMIDE AND ALBUTEROL SULFATE 2.5; .5 MG/3ML; MG/3ML
3 SOLUTION RESPIRATORY (INHALATION)
Status: DISCONTINUED | OUTPATIENT
Start: 2023-07-24 | End: 2023-07-25 | Stop reason: HOSPADM

## 2023-07-24 RX ORDER — MULTIPLE VITAMINS W/ MINERALS TAB 9MG-400MCG
1 TAB ORAL DAILY
Status: DISCONTINUED | OUTPATIENT
Start: 2023-07-24 | End: 2023-07-25 | Stop reason: HOSPADM

## 2023-07-24 RX ORDER — PREDNISONE 20 MG/1
60 TABLET ORAL
Status: DISCONTINUED | OUTPATIENT
Start: 2023-07-25 | End: 2023-07-25 | Stop reason: HOSPADM

## 2023-07-24 RX ORDER — LEVETIRACETAM 500 MG/1
1000 TABLET ORAL EVERY EVENING
COMMUNITY

## 2023-07-24 RX ORDER — NICOTINE 21 MG/24HR
1 PATCH, TRANSDERMAL 24 HOURS TRANSDERMAL
Status: DISCONTINUED | OUTPATIENT
Start: 2023-07-24 | End: 2023-07-25 | Stop reason: HOSPADM

## 2023-07-24 RX ORDER — FAMOTIDINE 20 MG/1
20 TABLET, FILM COATED ORAL NIGHTLY
Status: DISCONTINUED | OUTPATIENT
Start: 2023-07-24 | End: 2023-07-25 | Stop reason: HOSPADM

## 2023-07-24 RX ORDER — ARFORMOTEROL TARTRATE 15 UG/2ML
15 SOLUTION RESPIRATORY (INHALATION)
Status: DISCONTINUED | OUTPATIENT
Start: 2023-07-24 | End: 2023-07-25 | Stop reason: HOSPADM

## 2023-07-24 RX ORDER — BUDESONIDE 0.5 MG/2ML
0.5 INHALANT ORAL
Status: DISCONTINUED | OUTPATIENT
Start: 2023-07-24 | End: 2023-07-25 | Stop reason: HOSPADM

## 2023-07-24 RX ORDER — ARIPIPRAZOLE 15 MG/1
7.5 TABLET ORAL DAILY
COMMUNITY

## 2023-07-24 RX ORDER — ESCITALOPRAM OXALATE 10 MG/1
10 TABLET ORAL DAILY
Status: DISCONTINUED | OUTPATIENT
Start: 2023-07-24 | End: 2023-07-25 | Stop reason: HOSPADM

## 2023-07-24 RX ADMIN — IPRATROPIUM BROMIDE AND ALBUTEROL SULFATE 3 ML: .5; 3 SOLUTION RESPIRATORY (INHALATION) at 06:52

## 2023-07-24 RX ADMIN — METHYLPREDNISOLONE SODIUM SUCCINATE 40 MG: 40 INJECTION INTRAMUSCULAR; INTRAVENOUS at 08:45

## 2023-07-24 RX ADMIN — ENOXAPARIN SODIUM 40 MG: 100 INJECTION SUBCUTANEOUS at 08:46

## 2023-07-24 RX ADMIN — DOCUSATE SODIUM 50MG AND SENNOSIDES 8.6MG 2 TABLET: 8.6; 5 TABLET, FILM COATED ORAL at 08:45

## 2023-07-24 RX ADMIN — DOXYCYCLINE 100 MG: 100 CAPSULE ORAL at 08:46

## 2023-07-24 RX ADMIN — LEVETIRACETAM 500 MG: 500 TABLET, FILM COATED ORAL at 08:45

## 2023-07-24 RX ADMIN — GUAIFENESIN 600 MG: 600 TABLET ORAL at 08:46

## 2023-07-24 RX ADMIN — MULTIPLE VITAMINS W/ MINERALS TAB 1 TABLET: TAB at 10:22

## 2023-07-24 RX ADMIN — NICOTINE 1 PATCH: 21 PATCH, EXTENDED RELEASE TRANSDERMAL at 10:23

## 2023-07-24 RX ADMIN — LEVETIRACETAM 1000 MG: 500 TABLET, FILM COATED ORAL at 17:31

## 2023-07-24 RX ADMIN — Medication 10 ML: at 08:46

## 2023-07-24 RX ADMIN — IPRATROPIUM BROMIDE AND ALBUTEROL SULFATE 3 ML: .5; 3 SOLUTION RESPIRATORY (INHALATION) at 15:59

## 2023-07-24 RX ADMIN — ARFORMOTEROL TARTRATE 15 MCG: 15 SOLUTION RESPIRATORY (INHALATION) at 18:43

## 2023-07-24 RX ADMIN — LEVOTHYROXINE SODIUM 100 MCG: 0.1 TABLET ORAL at 05:44

## 2023-07-24 RX ADMIN — IPRATROPIUM BROMIDE AND ALBUTEROL SULFATE 3 ML: .5; 3 SOLUTION RESPIRATORY (INHALATION) at 18:43

## 2023-07-24 RX ADMIN — BUDESONIDE 0.5 MG: 0.5 SUSPENSION RESPIRATORY (INHALATION) at 18:43

## 2023-07-24 RX ADMIN — ESCITALOPRAM OXALATE 10 MG: 10 TABLET ORAL at 10:23

## 2023-07-24 NOTE — CASE MANAGEMENT/SOCIAL WORK
Discharge Planning Assessment   Arthur     Patient Name: Deepti Ramirez  MRN: [id number]  Today's Date: 7/24/2023    Admit Date: 7/23/2023    Plan: patient presents with shortness of breath, admitted for hypoxic respiratory failure likely and COPD exacerbation. Patient reports she lives with her spouse, is independent outside of driving, her  provides transportation. Patient is on 2LNC does not wear home O2, CM will folow for home O2 set up, pcp and pharm confirmed   Discharge Needs Assessment       Row Name 07/24/23 1150       Living Environment    People in Home spouse    Current Living Arrangements home    Primary Care Provided by self;spouse/significant other    Family Caregiver if Needed spouse    Quality of Family Relationships helpful;involved;supportive       Resource/Environmental Concerns    Resource/Environmental Concerns none    Transportation Concerns none       Transition Planning    Patient/Family Anticipates Transition to home    Patient/Family Anticipated Services at Transition none    Transportation Anticipated family or friend will provide       Discharge Needs Assessment    Readmission Within the Last 30 Days no previous admission in last 30 days    Equipment Currently Used at Home none    Concerns to be Addressed discharge planning    Anticipated Changes Related to Illness none    Equipment Needed After Discharge none                   Discharge Plan       Row Name 07/24/23 1152       Plan    Plan patient presents with shortness of breath, admitted for hypoxic respiratory failure likely and COPD exacerbation. Patient reports she lives with her spouse, is independent outside of driving, her  provides transportation. Patient is on 2LNC does not wear home O2, CM will folow for home O2 set up, pcp and pharm confirmed    Final Discharge Disposition Code 01 - home or self-care                  Continued Care and Services - Admitted Since 7/23/2023    Coordination has not been  started for this encounter.          Demographic Summary       Row Name 07/24/23 1149       General Information    Admission Type inpatient    Arrived From home;emergency department    Referral Source admission list    Reason for Consult discharge planning    Preferred Language English       Contact Information    Permission Granted to Share Info With family/designee    Contact Information Obtained for                    Functional Status       Row Name 07/24/23 1149       Functional Status    Usual Activity Tolerance good    Current Activity Tolerance good       Assessment of Health Literacy    How often do you have someone help you read hospital materials? Occasionally    How often do you have problems learning about your medical condition because of difficulty understanding written information? Never    How often do you have a problem understanding what is told to you about your medical condition? Never    How confident are you filling out medical forms by yourself? Quite a bit    Health Literacy Good       Functional Status, IADL    Medications independent;assistive person    Meal Preparation independent;assistive person    Housekeeping independent;assistive person    Laundry independent;assistive person    Shopping independent;assistive person       Mental Status    General Appearance WDL WDL       Mental Status Summary    Recent Changes in Mental Status/Cognitive Functioning no changes                   Psychosocial    No documentation.                  Abuse/Neglect    No documentation.                  Legal    No documentation.                  Substance Abuse    No documentation.                  Patient Forms    No documentation.                     Priti Benavides RN

## 2023-07-24 NOTE — PROGRESS NOTES
[facility]   Hospitalist Progress Note  Date: 2023  Patient Name: Deepti Ramirez  : 1955  MRN: [id number]  Date of admission: 2023      Subjective   Subjective     Chief Complaint: Shortness of air, cough    Summary:   Deepti Ramirez is a 67 y.o. female past medical history of bipolar disorder, prior DVT no longer on anticoagulation, COPD not on oxygen, TIA, seizure disorder on Keppra who presents to the ER due to worsening shortness of breath.  Patient is a relatively poor historian and cannot tell me why she is no longer on anticoagulation for her prior history of DVT but states for the last 2 to 3 days she has had worsening exertional dyspnea as well as a worsening cough with associated fevers.  She has not checked her temperature at home however but does state she has been febrile.  She denies any sick contacts but continues to smoke a pack per day.  Due to the worsening dyspnea she came into the ER today for evaluation.  Upon arrival here she has a low-grade temperature of 100.2.  She is tachycardic to 105 and saturating in the mid 80s on room air requiring initiation of oxygen.  She was given steroids and nebulizer treatments for suspected COPD exacerbation.  The hospitalist service encounter for admission but at the time my exam patient appears persistently short of breath although she states that she feels better.  Denies having any chest pain.  No lower extremity edema or long sedentary periods.  No recent surgery.  She states that she has recently had an outpatient chest CT but was not put on any medicine to control her COPD.   Interval Followup:   Remains on 2 L with 93 point saturation.  Other vital signs stable B  Patient does have a oxygen concentrator at home but has not used it for more than 6 months.  Per patient she is feeling better with shortness of air improving.  Cough improving.  Wants to go home.  Does have tremors and shakes worse than baseline especially after  breathing treatment.    Review of Systems   All systems were reviewed and negative except for: Summary and interval follow-up    Objective   Objective     Vitals:   Temp:  [97.4 °F (36.3 °C)-99 °F (37.2 °C)] 98.4 °F (36.9 °C)  Heart Rate:  [] 85  Resp:  [16-18] 16  BP: ()/(49-86) 111/57  Flow (L/min):  [2] 2  Physical Exam    Constitutional: Awake, alert, no acute distress.  Appears anxious   Eyes: Pupils equal, sclerae anicteric, no conjunctival injection   HENT: NCAT, mucous membranes moist   Neck: Supple, no thyromegaly, no lymphadenopathy, trachea midline   Respiratory: Wheezing to auscultation bilaterally, nonlabored respirations    Cardiovascular: RRR, no murmurs, rubs, or gallops, palpable pedal pulses bilaterally   Gastrointestinal: Positive bowel sounds, soft, nontender, nondistended   Musculoskeletal: No bilateral ankle edema, no clubbing or cyanosis to extremities   Psychiatric: Appropriate affect, cooperative   Neurologic: Oriented x 3, strength symmetric in all extremities, Cranial Nerves grossly intact to confrontation, speech clear   Skin: No rashes     Result Review    Result Review:  I have personally reviewed the results for the past 24 hours and agree with these findings:  [x]  Laboratory  [x]  Microbiology  [x]  Radiology  [x]  EKG/Telemetry sinus tach cardia rate of 100  []  Cardiology/Vascular   []  Pathology  [x]  Old records  [x]  Other: Medications    Assessment & Plan   Assessment / Plan     Assessment:      Acute COPD exacerbation.  Hypoxemia no home oxygen use recently  History of seizure disorder  PTSD  Hypothyroidism  Anxiety/depression/bipolar disorder  Hyperlipidemia  Prior history of meningitis  History of DVT  Active tobacco use  History of TIA  Mild dementia  9 mm right upper lobe nodule increased in size from last years CT scan.        Plan:  DuoNeb, Pulmicort and brovana.  Bronchodilator bronchopulmonary hygiene protocol.  DC IV steroids switched to p.o.  Continue  p.o. doxycycline.  Sputum culture pending.  Procalcitonin negative.  Troponin, BNP and CBC within normal range.  Check 2D echo.  Nicotine patch.  Patient will need a walking oximetry prior to discharge.  Continue aspirin, Lipitor, Keppra and Synthroid.  PET scan as an outpatient.      Discussed plan with RN.  And .  Likely discharge in a.m.  We will do 6-minute walk test.    DVT prophylaxis:  Medical DVT prophylaxis orders are present.    CODE STATUS:   Level Of Support Discussed With: Patient  Code Status (Patient has no pulse and is not breathing): CPR (Attempt to Resuscitate)  Medical Interventions (Patient has pulse or is breathing): Full Support  Release to patient: Routine Release      Part of this note may be an electronic transcription/translation of spoken language to printed text using the Dragon Dictation System.     Electronically signed by Vin Sam MD, 07/24/23, 4:38 PM EDT.

## 2023-07-24 NOTE — PLAN OF CARE
Problem: Adult Inpatient Plan of Care  Goal: Plan of Care Review  7/24/2023 0556 by Madonna Saez RN  Outcome: Ongoing, Progressing  7/23/2023 2232 by Madonna Saez RN  Outcome: Ongoing, Progressing  Goal: Patient-Specific Goal (Individualized)  7/24/2023 0556 by Madonna Saez RN  Outcome: Ongoing, Progressing  7/23/2023 2232 by Madonna Saez RN  Outcome: Ongoing, Progressing  Goal: Absence of Hospital-Acquired Illness or Injury  7/24/2023 0556 by Madonna Saez RN  Outcome: Ongoing, Progressing  7/23/2023 2232 by Madonna Saez RN  Outcome: Ongoing, Progressing  Intervention: Prevent and Manage VTE (Venous Thromboembolism) Risk  Recent Flowsheet Documentation  Taken 7/23/2023 2045 by Madonna Saez RN  Activity Management: up ad samantha  Goal: Optimal Comfort and Wellbeing  7/24/2023 0556 by Madonna Saez RN  Outcome: Ongoing, Progressing  7/23/2023 2232 by Madonna Saez RN  Outcome: Ongoing, Progressing  Intervention: Provide Person-Centered Care  Recent Flowsheet Documentation  Taken 7/23/2023 2045 by Madonna Saez RN  Trust Relationship/Rapport:   care explained   reassurance provided   questions encouraged  Goal: Readiness for Transition of Care  7/24/2023 0556 by Madonna Saez RN  Outcome: Ongoing, Progressing  7/23/2023 2232 by Madonna Saez RN  Outcome: Ongoing, Progressing  Intervention: Mutually Develop Transition Plan  Recent Flowsheet Documentation  Taken 7/23/2023 2124 by Madonna Saez RN  Equipment Currently Used at Home: none  Transportation Anticipated: family or friend will provide  Patient/Family Anticipated Services at Transition: none  Patient/Family Anticipates Transition to: home     Problem: COPD (Chronic Obstructive Pulmonary Disease) Comorbidity  Goal: Maintenance of COPD Symptom Control  7/24/2023 0556 by Madonna Saez RN  Outcome: Ongoing, Progressing  7/23/2023 2232 by Madonna Saez RN  Outcome: Ongoing, Progressing  Intervention: Maintain COPD-Symptom  Control  Recent Flowsheet Documentation  Taken 7/23/2023 2045 by Madonna Saez RN  Medication Review/Management: medications reviewed     Problem: Hypertension Comorbidity  Goal: Blood Pressure in Desired Range  7/24/2023 0556 by Madonna Saez RN  Outcome: Ongoing, Progressing  7/23/2023 2232 by Madonna Saez RN  Outcome: Ongoing, Progressing  Intervention: Maintain Blood Pressure Management  Recent Flowsheet Documentation  Taken 7/23/2023 2045 by Madonna Saez RN  Medication Review/Management: medications reviewed     Problem: Seizure Disorder Comorbidity  Goal: Maintenance of Seizure Control  7/24/2023 0556 by Madonna Saez RN  Outcome: Ongoing, Progressing  7/23/2023 2232 by Madonna Saez RN  Outcome: Ongoing, Progressing     Problem: Adjustment to Illness COPD (Chronic Obstructive Pulmonary Disease)  Goal: Optimal Chronic Illness Coping  Outcome: Ongoing, Progressing     Problem: Functional Ability Impaired COPD (Chronic Obstructive Pulmonary Disease)  Goal: Optimal Level of Functional Floyd  Outcome: Ongoing, Progressing  Intervention: Optimize Functional Ability  Recent Flowsheet Documentation  Taken 7/23/2023 2045 by Madonna Saez RN  Activity Management: up ad samantha     Problem: Infection COPD (Chronic Obstructive Pulmonary Disease)  Goal: Absence of Infection Signs and Symptoms  Outcome: Ongoing, Progressing     Problem: Oral Intake Inadequate COPD (Chronic Obstructive Pulmonary Disease)  Goal: Improved Nutrition Intake  Outcome: Ongoing, Progressing     Problem: Respiratory Compromise COPD (Chronic Obstructive Pulmonary Disease)  Goal: Effective Oxygenation and Ventilation  Outcome: Ongoing, Progressing  Intervention: Promote Airway Secretion Clearance  Recent Flowsheet Documentation  Taken 7/23/2023 2045 by Madonna Saez RN  Activity Management: up ad samantha  Cough And Deep Breathing: done independently per patient   Goal Outcome Evaluation:

## 2023-07-25 ENCOUNTER — APPOINTMENT (OUTPATIENT)
Dept: CARDIOLOGY | Facility: HOSPITAL | Age: 68
DRG: 191 | End: 2023-07-25
Payer: MEDICARE

## 2023-07-25 VITALS
HEIGHT: 61 IN | SYSTOLIC BLOOD PRESSURE: 138 MMHG | RESPIRATION RATE: 22 BRPM | TEMPERATURE: 98.2 F | DIASTOLIC BLOOD PRESSURE: 81 MMHG | OXYGEN SATURATION: 93 % | HEART RATE: 88 BPM | WEIGHT: 159.61 LBS | BODY MASS INDEX: 30.14 KG/M2

## 2023-07-25 PROBLEM — J96.01 ACUTE RESPIRATORY FAILURE WITH HYPOXIA: Status: RESOLVED | Noted: 2023-07-23 | Resolved: 2023-07-25

## 2023-07-25 LAB
ANION GAP SERPL CALCULATED.3IONS-SCNC: 9.1 MMOL/L (ref 5–15)
BASOPHILS # BLD AUTO: 0.01 10*3/MM3 (ref 0–0.2)
BASOPHILS NFR BLD AUTO: 0.1 % (ref 0–1.5)
BUN SERPL-MCNC: 19 MG/DL (ref 8–23)
BUN/CREAT SERPL: 22.4 (ref 7–25)
CALCIUM SPEC-SCNC: 8.7 MG/DL (ref 8.6–10.5)
CHLORIDE SERPL-SCNC: 105 MMOL/L (ref 98–107)
CO2 SERPL-SCNC: 23.9 MMOL/L (ref 22–29)
CREAT SERPL-MCNC: 0.85 MG/DL (ref 0.57–1)
DEPRECATED RDW RBC AUTO: 45.1 FL (ref 37–54)
EGFRCR SERPLBLD CKD-EPI 2021: 74.7 ML/MIN/1.73
EOSINOPHIL # BLD AUTO: 0.06 10*3/MM3 (ref 0–0.4)
EOSINOPHIL NFR BLD AUTO: 0.8 % (ref 0.3–6.2)
ERYTHROCYTE [DISTWIDTH] IN BLOOD BY AUTOMATED COUNT: 12.4 % (ref 12.3–15.4)
GLUCOSE SERPL-MCNC: 90 MG/DL (ref 65–99)
HCT VFR BLD AUTO: 42.4 % (ref 34–46.6)
HGB BLD-MCNC: 14.5 G/DL (ref 12–15.9)
IMM GRANULOCYTES # BLD AUTO: 0.02 10*3/MM3 (ref 0–0.05)
IMM GRANULOCYTES NFR BLD AUTO: 0.3 % (ref 0–0.5)
LYMPHOCYTES # BLD AUTO: 1.95 10*3/MM3 (ref 0.7–3.1)
LYMPHOCYTES NFR BLD AUTO: 26.2 % (ref 19.6–45.3)
MAGNESIUM SERPL-MCNC: 2.4 MG/DL (ref 1.6–2.4)
MCH RBC QN AUTO: 33.6 PG (ref 26.6–33)
MCHC RBC AUTO-ENTMCNC: 34.2 G/DL (ref 31.5–35.7)
MCV RBC AUTO: 98.4 FL (ref 79–97)
MONOCYTES # BLD AUTO: 0.66 10*3/MM3 (ref 0.1–0.9)
MONOCYTES NFR BLD AUTO: 8.9 % (ref 5–12)
NEUTROPHILS NFR BLD AUTO: 4.74 10*3/MM3 (ref 1.7–7)
NEUTROPHILS NFR BLD AUTO: 63.7 % (ref 42.7–76)
NRBC BLD AUTO-RTO: 0 /100 WBC (ref 0–0.2)
PLATELET # BLD AUTO: 160 10*3/MM3 (ref 140–450)
PMV BLD AUTO: 10.5 FL (ref 6–12)
POTASSIUM SERPL-SCNC: 4.1 MMOL/L (ref 3.5–5.2)
RBC # BLD AUTO: 4.31 10*6/MM3 (ref 3.77–5.28)
SODIUM SERPL-SCNC: 138 MMOL/L (ref 136–145)
WBC NRBC COR # BLD: 7.44 10*3/MM3 (ref 3.4–10.8)

## 2023-07-25 PROCEDURE — 94664 DEMO&/EVAL PT USE INHALER: CPT

## 2023-07-25 PROCEDURE — 94799 UNLISTED PULMONARY SVC/PX: CPT

## 2023-07-25 PROCEDURE — 36415 COLL VENOUS BLD VENIPUNCTURE: CPT | Performed by: STUDENT IN AN ORGANIZED HEALTH CARE EDUCATION/TRAINING PROGRAM

## 2023-07-25 PROCEDURE — 63710000001 PREDNISONE PER 1 MG: Performed by: INTERNAL MEDICINE

## 2023-07-25 PROCEDURE — 85025 COMPLETE CBC W/AUTO DIFF WBC: CPT | Performed by: STUDENT IN AN ORGANIZED HEALTH CARE EDUCATION/TRAINING PROGRAM

## 2023-07-25 PROCEDURE — 25010000002 ENOXAPARIN PER 10 MG: Performed by: STUDENT IN AN ORGANIZED HEALTH CARE EDUCATION/TRAINING PROGRAM

## 2023-07-25 PROCEDURE — 80048 BASIC METABOLIC PNL TOTAL CA: CPT | Performed by: STUDENT IN AN ORGANIZED HEALTH CARE EDUCATION/TRAINING PROGRAM

## 2023-07-25 PROCEDURE — 97161 PT EVAL LOW COMPLEX 20 MIN: CPT

## 2023-07-25 PROCEDURE — 94618 PULMONARY STRESS TESTING: CPT

## 2023-07-25 PROCEDURE — 83735 ASSAY OF MAGNESIUM: CPT | Performed by: STUDENT IN AN ORGANIZED HEALTH CARE EDUCATION/TRAINING PROGRAM

## 2023-07-25 RX ORDER — DOXYCYCLINE 100 MG/1
100 CAPSULE ORAL EVERY 12 HOURS SCHEDULED
Qty: 11 CAPSULE | Refills: 0 | Status: SHIPPED | OUTPATIENT
Start: 2023-07-25 | End: 2023-07-31

## 2023-07-25 RX ORDER — FLUTICASONE PROPIONATE AND SALMETEROL XINAFOATE 45; 21 UG/1; UG/1
2 AEROSOL, METERED RESPIRATORY (INHALATION)
Qty: 12 G | Refills: 0 | Status: SHIPPED | OUTPATIENT
Start: 2023-07-25 | End: 2023-08-24

## 2023-07-25 RX ORDER — IPRATROPIUM BROMIDE AND ALBUTEROL SULFATE 2.5; .5 MG/3ML; MG/3ML
3 SOLUTION RESPIRATORY (INHALATION) EVERY 4 HOURS PRN
Qty: 120 ML | Refills: 0 | Status: SHIPPED | OUTPATIENT
Start: 2023-07-25 | End: 2023-08-24

## 2023-07-25 RX ORDER — NICOTINE 21 MG/24HR
1 PATCH, TRANSDERMAL 24 HOURS TRANSDERMAL
Qty: 14 PATCH | Refills: 0 | Status: SHIPPED | OUTPATIENT
Start: 2023-07-26 | End: 2023-08-09

## 2023-07-25 RX ORDER — PREDNISONE 20 MG/1
40 TABLET ORAL DAILY
Qty: 8 TABLET | Refills: 0 | Status: SHIPPED | OUTPATIENT
Start: 2023-07-26 | End: 2023-07-30

## 2023-07-25 RX ADMIN — IPRATROPIUM BROMIDE AND ALBUTEROL SULFATE 3 ML: .5; 3 SOLUTION RESPIRATORY (INHALATION) at 11:45

## 2023-07-25 RX ADMIN — GUAIFENESIN 600 MG: 600 TABLET ORAL at 08:04

## 2023-07-25 RX ADMIN — LEVETIRACETAM 500 MG: 500 TABLET, FILM COATED ORAL at 08:04

## 2023-07-25 RX ADMIN — ASPIRIN 81 MG: 81 TABLET, COATED ORAL at 08:04

## 2023-07-25 RX ADMIN — PREDNISONE 60 MG: 20 TABLET ORAL at 08:05

## 2023-07-25 RX ADMIN — ARFORMOTEROL TARTRATE 15 MCG: 15 SOLUTION RESPIRATORY (INHALATION) at 06:49

## 2023-07-25 RX ADMIN — NICOTINE 1 PATCH: 21 PATCH, EXTENDED RELEASE TRANSDERMAL at 08:05

## 2023-07-25 RX ADMIN — MULTIPLE VITAMINS W/ MINERALS TAB 1 TABLET: TAB at 08:05

## 2023-07-25 RX ADMIN — Medication 10 ML: at 08:19

## 2023-07-25 RX ADMIN — DOXYCYCLINE 100 MG: 100 CAPSULE ORAL at 08:04

## 2023-07-25 RX ADMIN — LEVOTHYROXINE SODIUM 100 MCG: 0.1 TABLET ORAL at 06:27

## 2023-07-25 RX ADMIN — ESCITALOPRAM OXALATE 10 MG: 10 TABLET ORAL at 08:04

## 2023-07-25 RX ADMIN — ENOXAPARIN SODIUM 40 MG: 100 INJECTION SUBCUTANEOUS at 08:05

## 2023-07-25 RX ADMIN — IPRATROPIUM BROMIDE AND ALBUTEROL SULFATE 3 ML: .5; 3 SOLUTION RESPIRATORY (INHALATION) at 06:49

## 2023-07-25 RX ADMIN — BUDESONIDE 0.5 MG: 0.5 SUSPENSION RESPIRATORY (INHALATION) at 06:49

## 2023-07-25 NOTE — PLAN OF CARE
Goal Outcome Evaluation:  Plan of Care Reviewed With: patient, spouse        Progress: improving  Outcome Evaluation: Pt medically cleared by MD for discharge. [facility] due [facility] in pt's network.

## 2023-07-25 NOTE — PLAN OF CARE
Goal Outcome Evaluation:  Plan of Care Reviewed With: patient, spouse        Progress: no change  Outcome Evaluation: Pt with shortness of breath with exertion, 2L NC, VSS. Received as a transfer from PCU today.

## 2023-07-25 NOTE — NURSING NOTE
Exercise Oximetry    Patient Name:Deepti Ramirez   MRN: [id number]   Date: 07/25/23             ROOM AIR BASELINE   SpO2% 94   Heart Rate 96   Blood Pressure      EXERCISE ON ROOM AIR SpO2% EXERCISE ON O2 @  LPM SpO2%   1 MINUTE 94 1 MINUTE    2 MINUTES 94 2 MINUTES    3 MINUTES 92 3 MINUTES    4 MINUTES 92 4 MINUTES    5 MINUTES 94 5 MINUTES    6 MINUTES 95 6 MINUTES               Distance Walked   Distance Walked   Dyspnea (Tina Scale)  Dyspnea (Tina Scale)   Fatigue (Tina Scale)   Fatigue (Tina Scale)   SpO2% Post Exercise   SpO2% Post Exercise   HR Post Exercise   HR Post Exercise   Time to Recovery   Time to Recovery     Comments:

## 2023-07-25 NOTE — DISCHARGE SUMMARY
[facility]        HOSPITALIST  DISCHARGE SUMMARY    Patient Name: Deepti Ramirez  : 1955  MRN: [id number]    Date of Admission: 2023  Date of Discharge:  2023  Primary Care Physician: Sweetie Duque APRN    Consults       Date and Time Order Name Status Description    2023  5:52 PM Hospitalist (on-call MD unless specified)              Active and Resolved Hospital Problems:  Active Hospital Problems   No active problems to display.      Resolved Hospital Problems    Diagnosis POA    Acute respiratory failure with hypoxia [J96.01] Yes   Acute COPD exacerbation.  Resolved  Hypoxemia no home oxygen use recently.  Resolved  History of seizure disorder  PTSD  Hypothyroidism.  Supplemented  Anxiety/depression/bipolar disorder  Hyperlipidemia  Prior history of meningitis  History of DVT  Active tobacco use  History of TIA  Mild dementia  9 mm right upper lobe nodule increased in size from last years CT scan.  ?  Exophthalmos/fine tremors.          Hospital Course     Hospital Course:  Deepti Ramirez is a 67 y.o. female past medical history of bipolar disorder, prior DVT no longer on anticoagulation, COPD not on oxygen, TIA, seizure disorder on Keppra who presents to the ER due to worsening shortness of breath.  Patient is a relatively poor historian and cannot tell me why she is no longer on anticoagulation for her prior history of DVT but states for the last 2 to 3 days she has had worsening exertional dyspnea as well as a worsening cough with associated fevers.  She has not checked her temperature at home however but does state she has been febrile.  She denies any sick contacts but continues to smoke a pack per day.  Due to the worsening dyspnea she came into the ER today for evaluation.  Upon arrival here she has a low-grade temperature of 100.2.  She is tachycardic to 105 and saturating in the mid 80s on room air requiring initiation of oxygen.  She was given steroids  and nebulizer treatments for suspected COPD exacerbation.  The hospitalist service encounter for admission but at the time my exam patient appears persistently short of breath although she states that she feels better.  Denies having any chest pain.  No lower extremity edema or long sedentary periods.  No recent surgery.  She states that she has recently had an outpatient chest CT but was not put on any medicine to control her COPD.   Interval Followup:   Has been on 2 L nasal cannula oxygen with 96% saturation.  Patient 6-minute walk test  Patient does have a oxygen concentrator at home but has not used it for more than 6 months.  Per patient she is feeling much better with shortness of air improving.  Cough improving.  Wants to go home.  Does have tremors and shakes worse than baseline especially after breathing treatment.        DISCHARGE Follow Up Recommendations for labs and diagnostics: PCP to order TSH/free T4.  PCP to follow-up on results of sputum culture pending at the time of discharge.  Follow with PCP in a week time.  PET scan by PCP for right upper lobe nodule 9 mm.      Day of Discharge     Vital Signs:  Temp:  [97.5 °F (36.4 °C)-98.4 °F (36.9 °C)] 98.2 °F (36.8 °C)  Heart Rate:  [] 94  Resp:  [16-24] 24  BP: (111-124)/(56-67) 124/67  Flow (L/min):  [2] 2    Physical Exam:     Constitutional: Awake, alert, no acute distress.  Appears anxious              Eyes: Pupils equal, sclerae anicteric, no conjunctival injection              HENT: NCAT, mucous membranes moist              Neck: Supple, no thyromegaly, no lymphadenopathy, trachea midline              Respiratory: Decreased wheezing to auscultation bilaterally, nonlabored respirations               Cardiovascular: RRR, no murmurs, rubs, or gallops, palpable pedal pulses bilaterally              Gastrointestinal: Positive bowel sounds, soft, nontender, nondistended              Musculoskeletal: No bilateral ankle edema, no clubbing or  cyanosis to extremities              Psychiatric: Appropriate affect, cooperative              Neurologic: Oriented x 3, strength symmetric in all extremities, Cranial Nerves grossly intact to confrontation, speech clear              Skin: No rashes     Discharge Details        Discharge Medications        New Medications        Instructions Start Date   doxycycline 100 MG capsule  Commonly known as: MONODOX   100 mg, Oral, Every 12 Hours Scheduled      fluticasone-salmeterol 45-21 MCG/ACT inhaler  Commonly known as: Advair HFA   2 puffs, Inhalation, 2 Times Daily - RT      ipratropium-albuterol 0.5-2.5 mg/3 ml nebulizer  Commonly known as: DUO-NEB   3 mL, Nebulization, Every 4 Hours PRN      nicotine 21 MG/24HR patch  Commonly known as: NICODERM CQ   1 patch, Transdermal, Every 24 Hours Scheduled   Start Date: July 26, 2023     predniSONE 20 MG tablet  Commonly known as: DELTASONE   40 mg, Oral, Daily   Start Date: July 26, 2023            Continue These Medications        Instructions Start Date   ARIPiprazole 15 MG tablet  Commonly known as: ABILIFY   7.5 mg, Oral, Daily      aspirin 81 MG EC tablet   81 mg, Oral, Daily      atorvastatin 80 MG tablet  Commonly known as: LIPITOR   40 mg, Oral, Nightly      B-12 Compliance Injection 1000 MCG/ML kit  Generic drug: Cyanocobalamin   1,000 mcg, Intramuscular, Every 30 Days      escitalopram 10 MG tablet  Commonly known as: LEXAPRO   10 mg, Daily      levETIRAcetam 500 MG tablet  Commonly known as: KEPPRA   1,000 mg, Oral, Every Evening      levETIRAcetam 500 MG tablet  Commonly known as: KEPPRA   500 mg, Oral, Every Morning      levothyroxine 100 MCG tablet  Commonly known as: SYNTHROID, LEVOTHROID   100 mcg, Oral, Daily      multivitamin with minerals tablet tablet   1 tablet, Oral, Daily      prazosin 5 MG capsule  Commonly known as: MINIPRESS   10 mg, Oral, Nightly      vitamin D 1.25 MG (12193 UT) capsule capsule  Commonly known as: ERGOCALCIFEROL   50,000 Units,  Oral, Weekly               Allergies   Allergen Reactions    Sulfa Antibiotics Swelling and Rash    Nickel Rash       Discharge Disposition:  Home or Self Care    Diet:  Diet Instructions       Diet: Cardiac Diets; No Salt Packet; Regular Texture (IDDSI 7); Thin (IDDSI 0)      Discharge Diet: Cardiac Diets    Cardiac Diet: No Salt Packet    Texture: Regular Texture (IDDSI 7)    Fluid Consistency: Thin (IDDSI 0)            Discharge Activity:   Activity Instructions       Activity as Tolerated              CODE STATUS:  Code Status and Medical Interventions:   Ordered at: 07/23/23 1834     Level Of Support Discussed With:    Patient     Code Status (Patient has no pulse and is not breathing):    CPR (Attempt to Resuscitate)     Medical Interventions (Patient has pulse or is breathing):    Full Support     Release to patient:    Routine Release         No future appointments.    Additional Instructions for the Follow-ups that You Need to Schedule       Discharge Follow-up with PCP   As directed       Currently Documented PCP:    Sweetie Duque APRN    PCP Phone Number:    [phone]     Follow Up Details: 1 week                 Pertinent  and/or Most Recent Results     PROCEDURES:   * Cannot find OR case *     LAB RESULTS:      Lab 07/25/23 0457 07/24/23 0416 07/23/23  1725 07/23/23  1430   WBC 7.44 4.64  --  6.23   HEMOGLOBIN 14.5 15.2  --  16.2*   HEMATOCRIT 42.4 45.0  --  44.9   PLATELETS 160 154  --  150   NEUTROS ABS 4.74 4.05  --  4.83   IMMATURE GRANS (ABS) 0.02 0.01  --  0.02   LYMPHS ABS 1.95 0.38*  --  0.67*   MONOS ABS 0.66 0.20  --  0.65   EOS ABS 0.06 0.00  --  0.04   MCV 98.4* 98.7*  --  95.9   PROCALCITONIN  --   --  0.05  --          Lab 07/25/23  0457 07/24/23  0416 07/23/23  1725   SODIUM 138 137 141   POTASSIUM 4.1 4.7 3.8   CHLORIDE 105 104 105   CO2 23.9 22.1 22.6   ANION GAP 9.1 10.9 13.4   BUN 19 16 11   CREATININE 0.85 0.71 0.78   EGFR 74.7 92.7 82.9   GLUCOSE 90 158* 158*   CALCIUM 8.7  8.8 9.0   MAGNESIUM 2.4 2.3  --          Lab 07/23/23  1725   TOTAL PROTEIN 6.6   ALBUMIN 3.9   GLOBULIN 2.7   ALT (SGPT) 18   AST (SGOT) 19   BILIRUBIN 0.2   ALK PHOS 109         Lab 07/23/23  1725   PROBNP 239.2   HSTROP T 7                 Lab 07/23/23 2000   PH, ARTERIAL 7.380   PCO2, ARTERIAL 37.5   PO2 ART 66.9*   O2 SATURATION ART 93.5*   FIO2 28   HCO3 ART 21.7*   BASE EXCESS ART -2.9*   CARBOXYHEMOGLOBIN 3.5*     Brief Urine Lab Results       None          Microbiology Results (last 10 days)       Procedure Component Value - Date/Time    Respiratory Culture - Sputum, Cough [[id number]] Collected: 07/23/23 2251    Lab Status: Preliminary result Specimen: Sputum from Cough Updated: 07/25/23 0954     Respiratory Culture Moderate growth (3+) The culture consists of normal respiratory tova. This is a preliminary report; final report to follow.     Gram Stain Moderate (3+) WBCs seen      Rare (1+) Gram positive cocci in pairs, chains and clusters      Occasional Gram positive bacilli    COVID PRE-OP / PRE-PROCEDURE SCREENING ORDER (NO ISOLATION) - Swab, Nasopharynx [[id number]]  (Normal) Collected: 07/23/23 1846    Lab Status: Final result Specimen: Swab from Nasopharynx Updated: 07/23/23 2003    Narrative:      The following orders were created for panel order COVID PRE-OP / PRE-PROCEDURE SCREENING ORDER (NO ISOLATION) - Swab, Nasopharynx.  Procedure                               Abnormality         Status                     ---------                               -----------         ------                     COVID-19,CEPHEID/JIMMY,CO...[[id number]]  Normal              Final result                 Please view results for these tests on the individual orders.    COVID-19,CEPHEID/JIMMY,COR/SETH/PAD/JAYCEE/MAD IN-HOUSE(OR EMERGENT/ADD-ON),NP SWAB IN TRANSPORT MEDIA 3-4 HR TAT, RT-PCR - Swab, Nasopharynx [[id number]]  (Normal) Collected: 07/23/23 1846    Lab Status: Final result Specimen: Swab from Nasopharynx Updated:  07/23/23 2003     COVID19 Not Detected    Narrative:      Fact sheet for providers: https://www.fda.gov/media/068179/download     Fact sheet for patients: https://www.fda.gov/media/555377/download  Fact sheet for providers: https://www.fda.gov/media/031433/download     Fact sheet for patients: https://www.fda.gov/media/715615/download            CT Chest With Contrast Diagnostic    Result Date: 7/23/2023  Impression:   1. No evidence of pulmonary emboli. 2. Trace right pleural fluid. 3. Mildly increased size of 9 mm right upper lobe nodule.  Recommend further evaluation with PET-CT.      YOVANY VASQUEZ MD       Electronically Signed and Approved By: YOVANY VASQUEZ MD on 7/23/2023 at 20:03             XR Chest 1 View    Result Date: 7/23/2023  Impression:   The pulmonary interstitium remains diffusely prominent.  No consolidation or mass is evident.       BRYSON CARDENAS MD       Electronically Signed and Approved By: BRYSON CARDENAS MD on 7/23/2023 at 14:06              Results for orders placed during the hospital encounter of 06/10/21    Duplex Carotid Ultrasound CAR    Interpretation Summary  · All other left side carotid system vessels are normal.  · All other right side carotid system vessels are normal.      Results for orders placed during the hospital encounter of 06/10/21    Duplex Carotid Ultrasound CAR    Interpretation Summary  · All other left side carotid system vessels are normal.  · All other right side carotid system vessels are normal.      Results for orders placed during the hospital encounter of 06/10/21    Adult Transthoracic Echo Complete W/ Cont if Necessary Per Protocol (With Agitated Saline)    Interpretation Summary  · Left ventricular ejection fraction appears to be 51 - 55%.  · There is grade 1 diastolic dysfunction, impaired relaxation of the left ventricle  · There are no hemodynamically significant valvular abnormalities  · Technically difficult study      Imaging Results (All)        Procedure Component Value Units Date/Time    CT Chest With Contrast Diagnostic [[id number]] Collected: 07/23/23 2003     Updated: 07/23/23 2006    Narrative:      PROCEDURE: CT CHEST W CONTRAST DIAGNOSTIC     COMPARISON:  [facility], CT, CT CHEST LOW DOSE CANCER SCREENING WO,   8/12/2022, 10:56.  [facility], CT, CT CHEST LOW DOSE CANCER SCREENING WO,   11/18/2022, 12:18.  [facility], CR, XR CHEST 1 VW, 7/23/2023, 13:58.  [facility], CT, CT CHEST WO CONTRAST DIAGNOSTIC, 5/19/2023, 18:14.     INDICATIONS: Pulmonary embolism (PE) suspected, high prob     TECHNIQUE: After obtaining the patient's consent, CT images were obtained with non-ionic   intravenous contrast material.       PROTOCOL:   Standard imaging protocol performed      RADIATION:   DLP:273.5mGy*cm    Automated exposure control was utilized to minimize radiation dose.   CONTRAST: 80ccIsovue 370 I.V.     FINDINGS:   No pulmonary arterial filling defects are seen to suggest pulmonary emboli.  Main pulmonary artery   is nondilated.  Heart size is normal.  There is no pleural effusion.  There is trace right pleural   fluid.  9 x 9 mm nodule in the anterior right upper lobe on axial image 35 is mildly increased in   size compared to 8/12/2022, when it measured 7 x 6 mm.  Other smaller lung nodules appear stable.    Mild chronic subpleural interstitial opacities are stable.  No consolidation is seen.  Partially   included solid organs of the upper abdomen appear unremarkable for the phase of contrast   enhancement.  No acute osseous abnormality is identified.       Impression:         1. No evidence of pulmonary emboli.  2. Trace right pleural fluid.  3. Mildly increased size of 9 mm right upper lobe nodule.  Recommend further evaluation with   PET-CT.               YOVANY VASQUEZ MD         Electronically Signed and Approved By: YOVANY VASQUEZ MD on 7/23/2023 at 20:03                      XR Chest 1 View [[id number]] Collected: 07/23/23 1406     Updated: 07/23/23 1409    Narrative:      PROCEDURE: XR CHEST 1 VW     COMPARISON: [facility], CT, CT CHEST WO CONTRAST DIAGNOSTIC, 5/19/2023,   18:14.  [facility], CR, XR CHEST 2 VW, 10/21/2022, 13:14.     INDICATIONS: Shortness of Breath; Cough; Fever     FINDINGS:   The heart is normal in size.     The lungs are well-expanded.     The pulmonary interstitium remains diffusely prominent.  No consolidation or mass is evident.  Sub   cm lung nodules were seen on CT scan 5/19/2023.     Bony structures appear intact.       Impression:         The pulmonary interstitium remains diffusely prominent.  No consolidation or mass is evident.                  BRYSON CARDENAS MD         Electronically Signed and Approved By: BRYSON CARDENAS MD on 7/23/2023 at 14:06                              Labs Pending at Discharge:  Pending Labs       Order Current Status    Respiratory Culture - Sputum, Cough Preliminary result                Time spent on Discharge including face to face service: 31 minutes  Part of this note may be an electronic transcription/translation of spoken language to printed text using the Dragon Dictation System.     TElectronically signed by Vin Sam MD, 07/25/23, 2:52 PM EDT.

## 2023-07-25 NOTE — PLAN OF CARE
Goal Outcome Evaluation:  Plan of Care Reviewed With: patient           Outcome Evaluation: Patient is able to complete all transfers and ambulate independently in her room.  She does not need inpatient PT services.      Anticipated Discharge Disposition (PT): home

## 2023-07-25 NOTE — THERAPY EVALUATION
Acute Care - Physical Therapy Initial Evaluation   [facility]     Patient Name: Deepti Ramirez  : 1955  MRN: [id number]  Today's Date: 2023      Visit Dx:     ICD-10-CM ICD-9-CM   1. COPD exacerbation  J44.1 491.21   2. Acute respiratory failure with hypoxia  J96.01 518.81   3. Difficulty walking  R26.2 719.7     Patient Active Problem List   Diagnosis    History of pulmonary embolus (PE)    Bipolar disorder    Chronic obstructive pulmonary disease    Current smoker    Osteoporosis    Posttraumatic stress disorder    Complicated UTI (urinary tract infection)    Encephalopathy, metabolic    Encephalopathy acute    High risk human papilloma virus (HPV) infection of cervix    High risk human papilloma virus (HPV) infection of cervix     Past Medical History:   Diagnosis Date    Anemia     Anxiety     Arthritis     Asthma     Chronic obstructive pulmonary disease     Dementia     per  starting to get dementia    Depression     DVT (deep venous thrombosis)     History of pulmonary embolus (PE)     Meningitis 10/2020    Metabolic encephalopathy     Seasonal allergies     Seizure     dr. Powell     Sinus infection     Thyroid disorder     TIA (transient ischemic attack)     Vulva cancer     vulva     Past Surgical History:   Procedure Laterality Date    COLONOSCOPY  2019    DILATATION AND CURETTAGE N/A 11/3/2021    Procedure: FRACTIONAL DILATATION AND CURETTAGE;  Surgeon: Yady Cha MD;  Location: [facility] OR [place];  Service: Gynecology;  Laterality: N/A;    IVC FILTER RETRIEVAL      filter placed and removal     LEEP N/A 11/3/2021    Procedure: LOOP ELECTROCAUTERY EXCISION PROCEDURE;  Surgeon: Yady Cha MD;  Location: [facility] OR [place];  Service: Gynecology;  Laterality: N/A;    ORIF TIBIA/FIBULA FRACTURES      TEETH EXTRACTION      2 teeth pulled     VULVECTOMY       PT Assessment (last 12 hours)       PT Evaluation and Treatment       Row Name 23 [id number]          Physical  Therapy Time and Intention    Subjective Information no complaints  -DP     Document Type evaluation  -DP     Mode of Treatment individual therapy;physical therapy  -DP     Patient Effort good  -DP       Row Name 07/25/23 1500          General Information    Patient Profile Reviewed yes  -DP     Patient Observations alert;cooperative;agree to therapy  -DP     Prior Level of Function independent:;gait;transfer;bed mobility;ADL's  -DP     Equipment Currently Used at Home none  -DP     Existing Precautions/Restrictions no known precautions/restrictions  -DP     Barriers to Rehab none identified  -DP       Row Name 07/25/23 1500          Living Environment    Current Living Arrangements home  -DP     People in Home spouse  -DP       Row Name 07/25/23 1500          Home Use of Assistive/Adaptive Equipment    Equipment Currently Used at Home none  -DP       Row Name 07/25/23 1500          Range of Motion (ROM)    Range of Motion bilateral lower extremities;ROM is WFL  -DP       Row Name 07/25/23 1500          Strength (Manual Muscle Testing)    Strength (Manual Muscle Testing) bilateral lower extremities;strength is WFL  -DP       Row Name 07/25/23 1500          Bed Mobility    Bed Mobility bed mobility (all) activities  -DP     All Activities, [place] (Bed Mobility) independent  -DP       Row Name 07/25/23 1500          Transfers    Transfers sit-stand transfer  -DP       Row Name 07/25/23 1500          Sit-Stand Transfer    Sit-Stand [place] (Transfers) independent  -DP       Row Name 07/25/23 1500          Gait/Stairs (Locomotion)    Gait/Stairs Locomotion gait/ambulation independence  -DP     [place] Level (Gait) independent  -DP     Assistive Device (Gait) --  none  -DP     Distance in Feet (Gait) 300  -DP       Row Name 07/25/23 1500          Balance    Balance Assessment standing dynamic balance  -DP     Dynamic Standing Balance independent  -DP       Row Name 07/25/23 1500          Plan of Care  Review    Plan of Care Reviewed With patient  -DP     Outcome Evaluation Patient is able to complete all transfers and ambulate independently in her room.  She does not need inpatient PT services.  -DP       Row Name 07/25/23 1500          Therapy Assessment/Plan (PT)    Criteria for Skilled Interventions Met (PT) no problems identified which require skilled intervention  -DP     Therapy Frequency (PT) evaluation only  -DP       Row Name 07/25/23 1500          PT Evaluation Complexity    History, PT Evaluation Complexity no personal factors and/or comorbidities  -DP     Examination of Body Systems (PT Eval Complexity) total of 4 or more elements  -DP     Clinical Presentation (PT Evaluation Complexity) stable  -DP     Clinical Decision Making (PT Evaluation Complexity) low complexity  -DP     Overall Complexity (PT Evaluation Complexity) low complexity  -DP               User Key  (r) = Recorded By, (t) = Taken By, (c) = Cosigned By      Initials Name Provider Type    Jaime Estes, PT Physical Therapist                  PHYSICAL THERAPY GOALS/ PLAN    LTG 1:  Today:  Complete PT evaluation.  Treatment:  None  Time Frame/Durataion: 1 day  Outcome: Goal met      PT Recommendation and Plan  Anticipated Discharge Disposition (PT): home  Therapy Frequency (PT): evaluation only  Plan of Care Reviewed With: patient  Outcome Evaluation: Patient is able to complete all transfers and ambulate independently in her room.  She does not need inpatient PT services.   Outcome Measures       Row Name 07/25/23 1500             How much help from another person do you currently need...    Turning from your back to your side while in flat bed without using bedrails? 4  -DP      Moving from lying on back to sitting on the side of a flat bed without bedrails? 4  -DP      Moving to and from a bed to a chair (including a wheelchair)? 4  -DP      Standing up from a chair using your arms (e.g., wheelchair, bedside chair)? 4  -DP       Climbing 3-5 steps with a railing? 4  -DP      To walk in hospital room? 4  -DP      AM-PAC 6 Clicks Score (PT) 24  -DP         Functional Assessment    Outcome Measure Options AM-PAC 6 Clicks Basic Mobility (PT)  -DP                User Key  (r) = Recorded By, (t) = Taken By, (c) = Cosigned By      Initials Name Provider Type    Jaime Estes, PT Physical Therapist                     Time Calculation:    PT Charges       Row Name 07/25/23 1526             Time Calculation    PT Received On 07/25/23  -DP         Untimed Charges    PT Eval/Re-eval Minutes 30  -DP         Total Minutes    Untimed Charges Total Minutes 30  -DP       Total Minutes 30  -DP                User Key  (r) = Recorded By, (t) = Taken By, (c) = Cosigned By      Initials Name Provider Type    Jaime Estes, PT Physical Therapist                      PT G-Codes  Outcome Measure Options: AM-PAC 6 Clicks Basic Mobility (PT)  AM-PAC 6 Clicks Score (PT): 24    Jaime Snowden, PT  7/25/2023

## 2023-07-26 ENCOUNTER — READMISSION MANAGEMENT (OUTPATIENT)
Dept: CALL CENTER | Facility: HOSPITAL | Age: 68
End: 2023-07-26
Payer: COMMERCIAL

## 2023-07-26 LAB
BACTERIA SPEC RESP CULT: NORMAL
GRAM STN SPEC: NORMAL

## 2023-07-26 NOTE — OUTREACH NOTE
Prep Survey      Flowsheet Row Responses   [facility] facility patient discharged from? [facility]   Is LACE score < 7 ? No   Eligibility Readm Mgmt   Discharge diagnosis Acute COPD exacerbation.   Does the patient have one of the following disease processes/diagnoses(primary or secondary)? COPD   Does the patient have Home health ordered? No   Is there a DME ordered? No   Prep survey completed? Yes            Zeynep TAYLOR - Registered Nurse

## 2023-08-04 ENCOUNTER — READMISSION MANAGEMENT (OUTPATIENT)
Dept: CALL CENTER | Facility: HOSPITAL | Age: 68
End: 2023-08-04
Payer: COMMERCIAL

## 2023-08-15 ENCOUNTER — READMISSION MANAGEMENT (OUTPATIENT)
Dept: CALL CENTER | Facility: HOSPITAL | Age: 68
End: 2023-08-15
Payer: COMMERCIAL

## 2023-08-15 NOTE — OUTREACH NOTE
COPD/PN Week 3 Survey      Flowsheet Row Responses   [facility]udd[facility] facility patient discharged from? [facility]   Does the patient have one of the following disease processes/diagnoses(primary or secondary)? COPD   Week 3 attempt successful? No   Unsuccessful attempts Attempt 1            Anam TAYLOR - Registered Nurse

## 2023-08-18 ENCOUNTER — READMISSION MANAGEMENT (OUTPATIENT)
Dept: CALL CENTER | Facility: HOSPITAL | Age: 68
End: 2023-08-18
Payer: COMMERCIAL

## 2023-08-18 NOTE — OUTREACH NOTE
COPD/PN Week 3 Survey      Flowsheet Row Responses   Baptism facility patient discharged from? [facility]   Does the patient have one of the following disease processes/diagnoses(primary or secondary)? COPD   Week 3 attempt successful? No   Unsuccessful attempts Attempt 2            GUILLERMO KAYE - Registered Nurse

## 2023-11-03 ENCOUNTER — TRANSCRIBE ORDERS (OUTPATIENT)
Dept: ADMINISTRATIVE | Facility: HOSPITAL | Age: 68
End: 2023-11-03
Payer: COMMERCIAL

## 2023-11-03 DIAGNOSIS — Z12.31 ENCOUNTER FOR SCREENING MAMMOGRAM FOR MALIGNANT NEOPLASM OF BREAST: Primary | ICD-10-CM

## 2023-11-07 ENCOUNTER — TRANSCRIBE ORDERS (OUTPATIENT)
Dept: ADMINISTRATIVE | Facility: HOSPITAL | Age: 68
End: 2023-11-07
Payer: COMMERCIAL

## 2023-11-07 DIAGNOSIS — R91.1 LUNG NODULE: Primary | ICD-10-CM

## 2023-11-22 ENCOUNTER — HOSPITAL ENCOUNTER (OUTPATIENT)
Dept: CT IMAGING | Facility: HOSPITAL | Age: 68
Discharge: HOME | End: 2023-11-22
Admitting: THORACIC SURGERY (CARDIOTHORACIC VASCULAR SURGERY)
Payer: MEDICARE

## 2023-11-22 DIAGNOSIS — R91.1 LUNG NODULE: ICD-10-CM

## 2023-11-22 PROCEDURE — 71250 CT THORAX DX C-: CPT

## 2024-01-26 ENCOUNTER — TRANSCRIBE ORDERS (OUTPATIENT)
Dept: ADMINISTRATIVE | Facility: HOSPITAL | Age: 69
End: 2024-01-26
Payer: COMMERCIAL

## 2024-01-26 DIAGNOSIS — R91.1 PULMONARY NODULE: Primary | ICD-10-CM

## 2024-03-26 ENCOUNTER — HOSPITAL ENCOUNTER (OUTPATIENT)
Dept: CT IMAGING | Facility: HOSPITAL | Age: 69
Discharge: HOME | End: 2024-03-26
Admitting: THORACIC SURGERY (CARDIOTHORACIC VASCULAR SURGERY)
Payer: MEDICARE

## 2024-03-26 DIAGNOSIS — R91.1 PULMONARY NODULE: ICD-10-CM

## 2024-03-26 PROCEDURE — 71250 CT THORAX DX C-: CPT

## 2024-12-09 ENCOUNTER — TRANSCRIBE ORDERS (OUTPATIENT)
Dept: ADMINISTRATIVE | Facility: HOSPITAL | Age: 69
End: 2024-12-09
Payer: COMMERCIAL

## 2024-12-09 DIAGNOSIS — Z12.31 ENCOUNTER FOR SCREENING MAMMOGRAM FOR MALIGNANT NEOPLASM OF BREAST: Primary | ICD-10-CM

## 2024-12-10 ENCOUNTER — TRANSCRIBE ORDERS (OUTPATIENT)
Dept: ADMINISTRATIVE | Facility: HOSPITAL | Age: 69
End: 2024-12-10
Payer: COMMERCIAL

## 2024-12-10 DIAGNOSIS — Z78.0 POSTMENOPAUSAL: Primary | ICD-10-CM

## 2024-12-18 ENCOUNTER — TRANSCRIBE ORDERS (OUTPATIENT)
Dept: ADMINISTRATIVE | Facility: HOSPITAL | Age: 69
End: 2024-12-18
Payer: COMMERCIAL

## 2024-12-18 DIAGNOSIS — F17.210 NICOTINE DEPENDENCE, CIGARETTES, UNCOMPLICATED: Primary | ICD-10-CM

## 2025-02-05 ENCOUNTER — HOSPITAL ENCOUNTER (OUTPATIENT)
Dept: BONE DENSITY | Facility: HOSPITAL | Age: 70
Discharge: HOME | End: 2025-02-05
Payer: MEDICARE

## 2025-02-05 ENCOUNTER — HOSPITAL ENCOUNTER (OUTPATIENT)
Dept: MAMMOGRAPHY | Facility: HOSPITAL | Age: 70
Discharge: HOME | End: 2025-02-05
Payer: MEDICARE

## 2025-02-05 DIAGNOSIS — Z12.31 ENCOUNTER FOR SCREENING MAMMOGRAM FOR MALIGNANT NEOPLASM OF BREAST: ICD-10-CM

## 2025-02-05 DIAGNOSIS — Z78.0 POSTMENOPAUSAL: ICD-10-CM

## 2025-02-05 PROCEDURE — 77063 BREAST TOMOSYNTHESIS BI: CPT

## 2025-02-05 PROCEDURE — 77080 DXA BONE DENSITY AXIAL: CPT

## 2025-02-05 PROCEDURE — 77067 SCR MAMMO BI INCL CAD: CPT

## 2025-02-20 ENCOUNTER — TRANSCRIBE ORDERS (OUTPATIENT)
Dept: ADMINISTRATIVE | Facility: HOSPITAL | Age: 70
End: 2025-02-20
Payer: MEDICARE

## 2025-02-20 DIAGNOSIS — R91.1 LUNG NODULE: Primary | ICD-10-CM

## 2025-06-10 ENCOUNTER — PREP FOR SURGERY (OUTPATIENT)
Dept: OTHER | Facility: HOSPITAL | Age: 70
End: 2025-06-10
Payer: MEDICARE

## 2025-06-10 ENCOUNTER — OFFICE VISIT (OUTPATIENT)
Dept: SURGERY | Facility: CLINIC | Age: 70
End: 2025-06-10
Payer: MEDICARE

## 2025-06-10 VITALS
HEART RATE: 83 BPM | SYSTOLIC BLOOD PRESSURE: 101 MMHG | BODY MASS INDEX: 30.58 KG/M2 | DIASTOLIC BLOOD PRESSURE: 64 MMHG | WEIGHT: 162 LBS | HEIGHT: 61 IN | OXYGEN SATURATION: 96 %

## 2025-06-10 DIAGNOSIS — Z12.11 ENCOUNTER FOR SCREENING FOR MALIGNANT NEOPLASM OF COLON: Primary | ICD-10-CM

## 2025-06-10 DIAGNOSIS — Z86.0100 HISTORY OF COLONIC POLYPS: ICD-10-CM

## 2025-06-10 DIAGNOSIS — Z12.11 SCREENING FOR MALIGNANT NEOPLASM OF COLON: Primary | ICD-10-CM

## 2025-06-10 RX ORDER — SODIUM CHLORIDE 0.9 % (FLUSH) 0.9 %
3 SYRINGE (ML) INJECTION EVERY 12 HOURS SCHEDULED
OUTPATIENT
Start: 2025-06-10

## 2025-06-10 RX ORDER — SODIUM CHLORIDE 9 MG/ML
40 INJECTION, SOLUTION INTRAVENOUS AS NEEDED
OUTPATIENT
Start: 2025-06-10

## 2025-06-10 RX ORDER — SODIUM CHLORIDE 0.9 % (FLUSH) 0.9 %
10 SYRINGE (ML) INJECTION AS NEEDED
OUTPATIENT
Start: 2025-06-10

## 2025-06-10 RX ORDER — POLYETHYLENE GLYCOL 3350 17 G/17G
POWDER, FOR SOLUTION ORAL
Qty: 238 PACKET | Refills: 0 | Status: SHIPPED | OUTPATIENT
Start: 2025-06-10

## 2025-06-10 NOTE — PROGRESS NOTES
Chief Complaint: Colonoscopy    Subjective      Colonoscopy consultation       History of Present Illness  Deepti Ramirez is a 69 y.o. female presents to [facility] for colonoscopy consultation.    Patient presents today on referral from Sweetie Richardson for colonoscopy consultation. Patient presents today without complaints for a screening colonoscopy. Denies any abdominal pain, diarrhea or rectal bleeding. Denies any family history of colorectal cancer. Admits to personal history of colonic polyps.     4/19: Colonoscopy ([facility]): Descending - benign colon polyp.     1/2013 - Colonoscopy ([place]): Cecum - tubular adenoma; Rectum - Hyperplastic; Internal hemorrhoids.   Objective     Past Medical History:   Diagnosis Date    Anemia     Anxiety     Arthritis     Asthma     Chronic obstructive pulmonary disease     Dementia     per  starting to get dementia    Depression     DVT (deep venous thrombosis)     History of pulmonary embolus (PE)     Meningitis 10/2020    Metabolic encephalopathy     Seasonal allergies     Seizure     dr. Powell     Sinus infection     Thyroid disorder     TIA (transient ischemic attack)     Vulva cancer     vulva       Past Surgical History:   Procedure Laterality Date    COLONOSCOPY  04/22/2019    DILATATION AND CURETTAGE N/A 11/3/2021    Procedure: FRACTIONAL DILATATION AND CURETTAGE;  Surgeon: Yady Cha MD;  Location: [facility] OR [facility];  Service: Gynecology;  Laterality: N/A;    IVC FILTER RETRIEVAL      filter placed and removal     LEEP N/A 11/3/2021    Procedure: LOOP ELECTROCAUTERY EXCISION PROCEDURE;  Surgeon: Yady Cha MD;  Location: [facility] OR [facility];  Service: Gynecology;  Laterality: N/A;    ORIF TIBIA/FIBULA FRACTURES      TEETH EXTRACTION      2 teeth pulled     VULVECTOMY  2004       Outpatient Medications Marked as Taking for the 6/10/25 encounter (Office Visit) with Cammie Webster, APRCUATE   Medication Sig Dispense  Refill    ARIPiprazole (ABILIFY) 15 MG tablet Take 0.5 tablets by mouth Daily. 45 tablet 5    aspirin 81 MG EC tablet Take 1 tablet by mouth Daily.      atorvastatin (LIPITOR) 80 MG tablet Take 0.5 tablets by mouth Every Night. 30 tablet 0    Cyanocobalamin (B-12 Compliance Injection) 1000 MCG/ML kit Inject 1 mL into the appropriate muscle as directed by prescriber Every 30 (Thirty) Days.      cyanocobalamin 1000 MCG/ML injection Inject 1 mL into the appropriate muscle as directed by prescriber Every 30 (Thirty) Days. 3 mL 3    levETIRAcetam (KEPPRA) 500 MG tablet Take 1 tablet by mouth Every Morning. 30 tablet 0    levETIRAcetam (KEPPRA) 500 MG tablet Take 2 tablets by mouth Every Evening.      levothyroxine (SYNTHROID, LEVOTHROID) 100 MCG tablet Take 1 tablet by mouth Daily. 90 tablet 2    multivitamin with minerals tablet tablet Take 1 tablet by mouth Daily.      prazosin (MINIPRESS) 5 MG capsule Take 2 capsules by mouth Every Night.      vitamin D (ERGOCALCIFEROL) 1.25 MG (94058 UT) capsule capsule Take 1 capsule by mouth 1 (One) Time Per Week.         Allergies   Allergen Reactions    Sulfa Antibiotics Swelling and Rash    Nickel Rash        Family History   Problem Relation Age of Onset    Arthritis Mother     Hypertension Mother     Diabetes Mother     Heart disease Father     Hypertension Father     Diabetes Father     Diabetes Sister        Social History     Socioeconomic History    Marital status:    Tobacco Use    Smoking status: Every Day     Current packs/day: 0.50     Average packs/day: 0.5 packs/day for 30.0 years (15.0 ttl pk-yrs)     Types: Cigarettes    Smokeless tobacco: Never    Tobacco comments:     last cig around 0630a   Vaping Use    Vaping status: Never Used   Substance and Sexual Activity    Alcohol use: Never    Drug use: Never    Sexual activity: Defer       Review of Systems   Constitutional:  Negative for chills and fever.   Gastrointestinal:  Negative for abdominal distention,  "abdominal pain, anal bleeding, blood in stool, constipation, diarrhea and rectal pain.        Vital Signs:   /64 (BP Location: Left arm, Patient Position: Sitting, Cuff Size: Large Adult)   Pulse 83   Ht 154.9 cm (61\")   Wt 73.5 kg (162 lb)   SpO2 96%   BMI 30.61 kg/m²      Physical Exam  Vitals and nursing note reviewed.   Constitutional:       General: She is not in acute distress.     Appearance: She is obese. She is not ill-appearing.   HENT:      Head: Normocephalic and atraumatic.   Cardiovascular:      Rate and Rhythm: Normal rate.   Pulmonary:      Effort: Pulmonary effort is normal.      Breath sounds: No stridor.   Abdominal:      Palpations: Abdomen is soft.      Tenderness: There is no guarding.   Musculoskeletal:         General: No deformity. Normal range of motion.   Skin:     General: Skin is warm and dry.      Coloration: Skin is not jaundiced.   Neurological:      General: No focal deficit present.      Mental Status: She is alert and oriented to person, place, and time.   Psychiatric:         Mood and Affect: Mood normal.         Thought Content: Thought content normal.          Result Review :          []  Laboratory  []  Radiology  []  Pathology  []  Microbiology  []  EKG/Telemetry   []  Cardiology/Vascular   []  Old records  I spent 15 minutes caring for Deepti on this date of service. This time includes time spent by me in the following activities: reviewing tests, obtaining and/or reviewing a separately obtained history, performing a medically appropriate examination and/or evaluation, ordering medications, tests, or procedures, and documenting information in the medical record        Assessment and Plan    Diagnoses and all orders for this visit:    1. Encounter for screening for malignant neoplasm of colon (Primary)    2. History of colonic polyps    Other orders  -     polyethylene glycol (MIRALAX) 17 g packet; Take as directed.  Instructions given in office.  Dispense: 238 g " bottle  Dispense: 238 packet; Refill: 0        Follow Up   Return for Schedule colonoscopy with Dr. Wu on 8/11/2025 [facility].    Hospital arrival time:0730    Possible risks/complications, benefits, and alternatives to surgical or invasive procedures have been explained to patient and/or legal guardian.    Patient has been evaluated and can tolerate anesthesia and/or sedation. Risks, benefits, and alternatives to anesthesia and sedation have been explained to the patient and/or legal guardian. Patient verbalizes understanding and is willing to proceed with the above plan.     Patient was given instructions and counseling regarding her condition or for health maintenance advice. Please see specific information pulled into the AVS if appropriate.     As always, it has been a pleasure to participate in your patient's care. Please call with questions or concerns.

## 2025-08-08 ENCOUNTER — ANESTHESIA EVENT (OUTPATIENT)
Dept: GASTROENTEROLOGY | Facility: HOSPITAL | Age: 70
End: 2025-08-08
Payer: MEDICARE

## 2025-08-11 ENCOUNTER — HOSPITAL ENCOUNTER (OUTPATIENT)
Facility: HOSPITAL | Age: 70
Setting detail: HOSPITAL OUTPATIENT SURGERY
Discharge: HOME | End: 2025-08-11
Attending: SURGERY | Admitting: SURGERY
Payer: MEDICARE

## 2025-08-11 ENCOUNTER — ANESTHESIA (OUTPATIENT)
Dept: GASTROENTEROLOGY | Facility: HOSPITAL | Age: 70
End: 2025-08-11
Payer: MEDICARE

## 2025-08-11 VITALS
HEIGHT: 61 IN | OXYGEN SATURATION: 96 % | TEMPERATURE: 97.9 F | SYSTOLIC BLOOD PRESSURE: 108 MMHG | DIASTOLIC BLOOD PRESSURE: 46 MMHG | HEART RATE: 105 BPM | WEIGHT: 160.94 LBS | BODY MASS INDEX: 30.39 KG/M2 | RESPIRATION RATE: 25 BRPM

## 2025-08-11 DIAGNOSIS — Z12.11 SCREENING FOR MALIGNANT NEOPLASM OF COLON: ICD-10-CM

## 2025-08-11 DIAGNOSIS — Z86.0100 HISTORY OF COLONIC POLYPS: ICD-10-CM

## 2025-08-11 PROCEDURE — 25010000002 PROPOFOL 10 MG/ML EMULSION: Performed by: NURSE ANESTHETIST, CERTIFIED REGISTERED

## 2025-08-11 PROCEDURE — 88305 TISSUE EXAM BY PATHOLOGIST: CPT | Performed by: SURGERY

## 2025-08-11 PROCEDURE — 25810000003 LACTATED RINGERS PER 1000 ML

## 2025-08-11 PROCEDURE — 25010000002 LIDOCAINE PF 2% 2 % SOLUTION: Performed by: NURSE ANESTHETIST, CERTIFIED REGISTERED

## 2025-08-11 RX ORDER — SODIUM CHLORIDE, SODIUM LACTATE, POTASSIUM CHLORIDE, CALCIUM CHLORIDE 600; 310; 30; 20 MG/100ML; MG/100ML; MG/100ML; MG/100ML
30 INJECTION, SOLUTION INTRAVENOUS CONTINUOUS
Status: DISCONTINUED | OUTPATIENT
Start: 2025-08-11 | End: 2025-08-11 | Stop reason: HOSPADM

## 2025-08-11 RX ORDER — ONDANSETRON 4 MG/1
4 TABLET, ORALLY DISINTEGRATING ORAL ONCE AS NEEDED
Status: DISCONTINUED | OUTPATIENT
Start: 2025-08-11 | End: 2025-08-11 | Stop reason: HOSPADM

## 2025-08-11 RX ORDER — SODIUM CHLORIDE 0.9 % (FLUSH) 0.9 %
3 SYRINGE (ML) INJECTION EVERY 12 HOURS SCHEDULED
Status: DISCONTINUED | OUTPATIENT
Start: 2025-08-11 | End: 2025-08-11 | Stop reason: HOSPADM

## 2025-08-11 RX ORDER — ONDANSETRON 2 MG/ML
4 INJECTION INTRAMUSCULAR; INTRAVENOUS ONCE AS NEEDED
Status: DISCONTINUED | OUTPATIENT
Start: 2025-08-11 | End: 2025-08-11 | Stop reason: HOSPADM

## 2025-08-11 RX ORDER — LIDOCAINE HYDROCHLORIDE 20 MG/ML
INJECTION, SOLUTION EPIDURAL; INFILTRATION; INTRACAUDAL; PERINEURAL AS NEEDED
Status: DISCONTINUED | OUTPATIENT
Start: 2025-08-11 | End: 2025-08-11 | Stop reason: SURG

## 2025-08-11 RX ORDER — PROPOFOL 10 MG/ML
VIAL (ML) INTRAVENOUS AS NEEDED
Status: DISCONTINUED | OUTPATIENT
Start: 2025-08-11 | End: 2025-08-11 | Stop reason: SURG

## 2025-08-11 RX ORDER — IPRATROPIUM BROMIDE AND ALBUTEROL SULFATE 2.5; .5 MG/3ML; MG/3ML
3 SOLUTION RESPIRATORY (INHALATION) ONCE
Status: COMPLETED | OUTPATIENT
Start: 2025-08-11 | End: 2025-08-11

## 2025-08-11 RX ORDER — SODIUM CHLORIDE 9 MG/ML
40 INJECTION, SOLUTION INTRAVENOUS AS NEEDED
Status: DISCONTINUED | OUTPATIENT
Start: 2025-08-11 | End: 2025-08-11 | Stop reason: HOSPADM

## 2025-08-11 RX ORDER — SODIUM CHLORIDE 0.9 % (FLUSH) 0.9 %
10 SYRINGE (ML) INJECTION AS NEEDED
Status: DISCONTINUED | OUTPATIENT
Start: 2025-08-11 | End: 2025-08-11 | Stop reason: HOSPADM

## 2025-08-11 RX ADMIN — IPRATROPIUM BROMIDE AND ALBUTEROL SULFATE 3 ML: .5; 3 SOLUTION RESPIRATORY (INHALATION) at 08:15

## 2025-08-11 RX ADMIN — PROPOFOL 200 MCG/KG/MIN: 10 INJECTION, EMULSION INTRAVENOUS at 09:03

## 2025-08-11 RX ADMIN — SODIUM CHLORIDE, POTASSIUM CHLORIDE, SODIUM LACTATE AND CALCIUM CHLORIDE 30 ML/HR: 600; 310; 30; 20 INJECTION, SOLUTION INTRAVENOUS at 08:16

## 2025-08-11 RX ADMIN — PROPOFOL 50 MG: 10 INJECTION, EMULSION INTRAVENOUS at 09:03

## 2025-08-11 RX ADMIN — LIDOCAINE HYDROCHLORIDE 40 MG: 20 INJECTION, SOLUTION EPIDURAL; INFILTRATION; INTRACAUDAL; PERINEURAL at 09:03

## 2025-08-13 LAB
CYTO UR: NORMAL
LAB AP CASE REPORT: NORMAL
LAB AP CLINICAL INFORMATION: NORMAL
PATH REPORT.FINAL DX SPEC: NORMAL
PATH REPORT.GROSS SPEC: NORMAL

## (undated) DEVICE — SLV SCD LEG COMFORT KENDALLSCD MD REPROC

## (undated) DEVICE — SNAR POLYP CAPTIFLEX XS/OVL 11X2.4MM 240CM 1P/U

## (undated) DEVICE — GOWN,REINFORCE,POLY,SIRUS,BREATH SLV,XLG: Brand: MEDLINE

## (undated) DEVICE — SINGLE-USE BIOPSY FORCEPS: Brand: RADIAL JAW 4

## (undated) DEVICE — SOL IRR NACL 0.9PCT BO 1000ML

## (undated) DEVICE — SOL IRRG H2O PL/BG 1000ML STRL

## (undated) DEVICE — GLV SURG BIOGEL LTX PF 6 1/2

## (undated) DEVICE — LINER SURG CANSTR SXN S/RIGD 1500CC

## (undated) DEVICE — DEV UTERINE EPLORA1 SHRP W/VACULOK SYR 3MM

## (undated) DEVICE — GLV SURG SENSICARE PI ORTHO SZ8 LF STRL

## (undated) DEVICE — SUT VIC 2/0 CT1 36IN

## (undated) DEVICE — MINOR-LF: Brand: MEDLINE INDUSTRIES, INC.

## (undated) DEVICE — PACK,LITHOTOMY,PK I: Brand: MEDLINE

## (undated) DEVICE — SOLIDIFIER LIQLOC PLS 1500CC BT

## (undated) DEVICE — DRAPE,UNDERBUTTOCKS,PCH,STERILE: Brand: MEDLINE

## (undated) DEVICE — STERILE POLYISOPRENE POWDER-FREE SURGICAL GLOVES WITH EMOLLIENT COATING: Brand: PROTEXIS

## (undated) DEVICE — THE SINGLE USE ETRAP – POLYP TRAP IS USED FOR SUCTION RETRIEVAL OF ENDOSCOPICALLY REMOVED POLYPS.: Brand: ETRAP

## (undated) DEVICE — PROCTO SWABS: Brand: DEROYAL

## (undated) DEVICE — THE STERILE LIGHT HANDLE COVER IS USED WITH STERIS SURGICAL LIGHTING AND VISUALIZATION SYSTEMS.

## (undated) DEVICE — CONN JET HYDRA H20 AUXILIARY DISP

## (undated) DEVICE — NDL HYPO ECLPS SFTY 18G 1 1/2IN

## (undated) DEVICE — DEFENDO AIR WATER SUCTION AND BIOPSY VALVE KIT: Brand: DEFENDO AIR/WATER/SUCTION AND BIOPSY VALVE

## (undated) DEVICE — ELECTRD LP COVIDIEN LLETZ TUNG 10X10MM

## (undated) DEVICE — SYR CONTRL LUERLOK 10CC

## (undated) DEVICE — Device

## (undated) DEVICE — LITHOTOMY-SLINGS: Brand: MEDLINE INDUSTRIES, INC.

## (undated) DEVICE — NDL SPINE 22G 31/2IN BLK

## (undated) DEVICE — MEDI-VAC NON-CONDUCTIVE SUCTION TUBING: Brand: CARDINAL HEALTH

## (undated) DEVICE — CAUTERY TIP POLISHER: Brand: DEVON

## (undated) DEVICE — TOWEL,OR,DSP,ST,BLUE,STD,4/PK,20PK/CS: Brand: MEDLINE

## (undated) DEVICE — GOWN,SIRUS,POLYRNF,BRTHSLV,2XL,18/CS: Brand: MEDLINE

## (undated) DEVICE — ELECTRD BALL LLETZ 5MM 13CM STRL

## (undated) DEVICE — CATH URETH INTRMIT ALLPURP LTX 16F RED